# Patient Record
Sex: MALE | Race: BLACK OR AFRICAN AMERICAN | NOT HISPANIC OR LATINO
[De-identification: names, ages, dates, MRNs, and addresses within clinical notes are randomized per-mention and may not be internally consistent; named-entity substitution may affect disease eponyms.]

---

## 2022-02-03 PROBLEM — Z00.00 ENCOUNTER FOR PREVENTIVE HEALTH EXAMINATION: Status: ACTIVE | Noted: 2022-02-03

## 2022-03-15 ENCOUNTER — NON-APPOINTMENT (OUTPATIENT)
Age: 62
End: 2022-03-15

## 2022-03-15 ENCOUNTER — APPOINTMENT (OUTPATIENT)
Dept: ORTHOPEDIC SURGERY | Facility: CLINIC | Age: 62
End: 2022-03-15
Payer: COMMERCIAL

## 2022-03-15 VITALS
SYSTOLIC BLOOD PRESSURE: 191 MMHG | WEIGHT: 210.5 LBS | BODY MASS INDEX: 31.9 KG/M2 | DIASTOLIC BLOOD PRESSURE: 94 MMHG | HEART RATE: 61 BPM | HEIGHT: 68 IN

## 2022-03-15 DIAGNOSIS — M25.552 PAIN IN LEFT HIP: ICD-10-CM

## 2022-03-15 DIAGNOSIS — M16.11 UNILATERAL PRIMARY OSTEOARTHRITIS, RIGHT HIP: ICD-10-CM

## 2022-03-15 DIAGNOSIS — M25.551 PAIN IN RIGHT HIP: ICD-10-CM

## 2022-03-15 DIAGNOSIS — Z86.39 PERSONAL HISTORY OF OTHER ENDOCRINE, NUTRITIONAL AND METABOLIC DISEASE: ICD-10-CM

## 2022-03-15 DIAGNOSIS — Z78.9 OTHER SPECIFIED HEALTH STATUS: ICD-10-CM

## 2022-03-15 PROCEDURE — 73521 X-RAY EXAM HIPS BI 2 VIEWS: CPT

## 2022-03-15 PROCEDURE — 99203 OFFICE O/P NEW LOW 30 MIN: CPT

## 2022-04-20 ENCOUNTER — APPOINTMENT (OUTPATIENT)
Dept: CARDIOLOGY | Facility: CLINIC | Age: 62
End: 2022-04-20

## 2022-07-14 ENCOUNTER — OUTPATIENT (OUTPATIENT)
Dept: OUTPATIENT SERVICES | Facility: HOSPITAL | Age: 62
LOS: 1 days | End: 2022-07-14
Payer: COMMERCIAL

## 2022-07-14 VITALS
WEIGHT: 205.03 LBS | HEART RATE: 61 BPM | HEIGHT: 69 IN | TEMPERATURE: 97 F | DIASTOLIC BLOOD PRESSURE: 87 MMHG | SYSTOLIC BLOOD PRESSURE: 151 MMHG | RESPIRATION RATE: 15 BRPM | OXYGEN SATURATION: 96 %

## 2022-07-14 DIAGNOSIS — M25.551 PAIN IN RIGHT HIP: ICD-10-CM

## 2022-07-14 DIAGNOSIS — Z98.890 OTHER SPECIFIED POSTPROCEDURAL STATES: Chronic | ICD-10-CM

## 2022-07-14 DIAGNOSIS — Z01.818 ENCOUNTER FOR OTHER PREPROCEDURAL EXAMINATION: ICD-10-CM

## 2022-07-14 DIAGNOSIS — M16.11 UNILATERAL PRIMARY OSTEOARTHRITIS, RIGHT HIP: ICD-10-CM

## 2022-07-14 LAB
A1C WITH ESTIMATED AVERAGE GLUCOSE RESULT: 7.3 % — HIGH (ref 4–5.6)
ALBUMIN SERPL ELPH-MCNC: 3.8 G/DL — SIGNIFICANT CHANGE UP (ref 3.3–5)
ALP SERPL-CCNC: 72 U/L — SIGNIFICANT CHANGE UP (ref 30–120)
ALT FLD-CCNC: 41 U/L DA — SIGNIFICANT CHANGE UP (ref 10–60)
ANION GAP SERPL CALC-SCNC: 8 MMOL/L — SIGNIFICANT CHANGE UP (ref 5–17)
APTT BLD: 32.7 SEC — SIGNIFICANT CHANGE UP (ref 27.5–35.5)
AST SERPL-CCNC: 21 U/L — SIGNIFICANT CHANGE UP (ref 10–40)
BILIRUB SERPL-MCNC: 0.4 MG/DL — SIGNIFICANT CHANGE UP (ref 0.2–1.2)
BLD GP AB SCN SERPL QL: SIGNIFICANT CHANGE UP
BUN SERPL-MCNC: 13 MG/DL — SIGNIFICANT CHANGE UP (ref 7–23)
CALCIUM SERPL-MCNC: 9.4 MG/DL — SIGNIFICANT CHANGE UP (ref 8.4–10.5)
CHLORIDE SERPL-SCNC: 101 MMOL/L — SIGNIFICANT CHANGE UP (ref 96–108)
CO2 SERPL-SCNC: 31 MMOL/L — SIGNIFICANT CHANGE UP (ref 22–31)
CREAT SERPL-MCNC: 0.99 MG/DL — SIGNIFICANT CHANGE UP (ref 0.5–1.3)
EGFR: 85 ML/MIN/1.73M2 — SIGNIFICANT CHANGE UP
ESTIMATED AVERAGE GLUCOSE: 163 MG/DL — HIGH (ref 68–114)
GLUCOSE SERPL-MCNC: 106 MG/DL — HIGH (ref 70–99)
HCT VFR BLD CALC: 45 % — SIGNIFICANT CHANGE UP (ref 39–50)
HGB BLD-MCNC: 14.4 G/DL — SIGNIFICANT CHANGE UP (ref 13–17)
INR BLD: 1.09 RATIO — SIGNIFICANT CHANGE UP (ref 0.88–1.16)
MCHC RBC-ENTMCNC: 27.8 PG — SIGNIFICANT CHANGE UP (ref 27–34)
MCHC RBC-ENTMCNC: 32 GM/DL — SIGNIFICANT CHANGE UP (ref 32–36)
MCV RBC AUTO: 86.9 FL — SIGNIFICANT CHANGE UP (ref 80–100)
MRSA PCR RESULT.: SIGNIFICANT CHANGE UP
NRBC # BLD: 0 /100 WBCS — SIGNIFICANT CHANGE UP (ref 0–0)
PLATELET # BLD AUTO: 184 K/UL — SIGNIFICANT CHANGE UP (ref 150–400)
POTASSIUM SERPL-MCNC: 4.1 MMOL/L — SIGNIFICANT CHANGE UP (ref 3.5–5.3)
POTASSIUM SERPL-SCNC: 4.1 MMOL/L — SIGNIFICANT CHANGE UP (ref 3.5–5.3)
PROT SERPL-MCNC: 8.2 G/DL — SIGNIFICANT CHANGE UP (ref 6–8.3)
PROTHROM AB SERPL-ACNC: 12.5 SEC — SIGNIFICANT CHANGE UP (ref 10.5–13.4)
RBC # BLD: 5.18 M/UL — SIGNIFICANT CHANGE UP (ref 4.2–5.8)
RBC # FLD: 13 % — SIGNIFICANT CHANGE UP (ref 10.3–14.5)
S AUREUS DNA NOSE QL NAA+PROBE: SIGNIFICANT CHANGE UP
SODIUM SERPL-SCNC: 140 MMOL/L — SIGNIFICANT CHANGE UP (ref 135–145)
WBC # BLD: 7.93 K/UL — SIGNIFICANT CHANGE UP (ref 3.8–10.5)
WBC # FLD AUTO: 7.93 K/UL — SIGNIFICANT CHANGE UP (ref 3.8–10.5)

## 2022-07-14 PROCEDURE — 93010 ELECTROCARDIOGRAM REPORT: CPT

## 2022-07-14 PROCEDURE — 93005 ELECTROCARDIOGRAM TRACING: CPT

## 2022-07-14 PROCEDURE — G0463: CPT

## 2022-07-14 NOTE — H&P PST ADULT - NSICDXPASTSURGICALHX_GEN_ALL_CORE_FT
PAST SURGICAL HISTORY:  History of arthroscopy of shoulder left, 1995    History of inguinal hernia repair bilateral, 1990's

## 2022-07-14 NOTE — H&P PST ADULT - HISTORY OF PRESENT ILLNESS
61 yo male reports bilateral hip pain which is worse on right and has prevented him from exercising and weight bearing.  Pain rates 8/10 at worst and radiates to his right buttock.  He is scheduled for right total hip replacement on 8/3/2022 @ [facility].

## 2022-07-14 NOTE — H&P PST ADULT - NSANTHOSAYNRD_GEN_A_CORE
No. JOSEMANUEL screening performed.  STOP BANG Legend: 0-2 = LOW Risk; 3-4 = INTERMEDIATE Risk; 5-8 = HIGH Risk

## 2022-07-14 NOTE — H&P PST ADULT - PROBLEM SELECTOR PLAN 1
Right total hip replacement is planned for 8/3/2022  Covid testing scheduled for 8/1/2022  Diagnostic testing performed; medical clearance requested  Pre op instructions were reviewed; best wishes offered

## 2022-07-14 NOTE — H&P PST ADULT - NSICDXFAMILYHX_GEN_ALL_CORE_FT
FAMILY HISTORY:  Father  Still living? Unknown  Family history of diabetes mellitus (DM), Age at diagnosis: Age Unknown    Mother  Still living? No  Family history of diabetes mellitus (DM), Age at diagnosis: Age Unknown

## 2022-07-14 NOTE — H&P PST ADULT - NEGATIVE ENMT SYMPTOMS
no hearing difficulty/no ear pain/no tinnitus/no vertigo/no sinus symptoms/no nasal congestion/no nasal discharge/no nasal obstruction/no post-nasal discharge/no nose bleeds/no abnormal taste sensation/no gum bleeding/no dry mouth/no throat pain/no dysphagia

## 2022-07-14 NOTE — H&P PST ADULT - MUSCULOSKELETAL
no joint swelling/no joint erythema/no joint warmth/no calf tenderness/decreased ROM due to pain/no chest wall tenderness details…

## 2022-07-14 NOTE — H&P PST ADULT - NSICDXPASTMEDICALHX_GEN_ALL_CORE_FT
PAST MEDICAL HISTORY:  COVID-19 vaccine series completed     Hypercholesteremia     Hypertension     Osteoarthritis of right hip     Type 2 diabetes mellitus

## 2022-07-26 PROBLEM — E78.00 PURE HYPERCHOLESTEROLEMIA, UNSPECIFIED: Chronic | Status: ACTIVE | Noted: 2022-07-14

## 2022-07-26 PROBLEM — M16.11 UNILATERAL PRIMARY OSTEOARTHRITIS, RIGHT HIP: Chronic | Status: ACTIVE | Noted: 2022-07-14

## 2022-07-26 PROBLEM — I10 ESSENTIAL (PRIMARY) HYPERTENSION: Chronic | Status: ACTIVE | Noted: 2022-07-14

## 2022-07-26 PROBLEM — E11.9 TYPE 2 DIABETES MELLITUS WITHOUT COMPLICATIONS: Chronic | Status: ACTIVE | Noted: 2022-07-14

## 2022-07-26 PROBLEM — Z92.29 PERSONAL HISTORY OF OTHER DRUG THERAPY: Chronic | Status: ACTIVE | Noted: 2022-07-14

## 2022-08-01 ENCOUNTER — OUTPATIENT (OUTPATIENT)
Dept: OUTPATIENT SERVICES | Facility: HOSPITAL | Age: 62
LOS: 1 days | End: 2022-08-01
Payer: COMMERCIAL

## 2022-08-01 DIAGNOSIS — Z98.890 OTHER SPECIFIED POSTPROCEDURAL STATES: Chronic | ICD-10-CM

## 2022-08-01 DIAGNOSIS — Z20.828 CONTACT WITH AND (SUSPECTED) EXPOSURE TO OTHER VIRAL COMMUNICABLE DISEASES: ICD-10-CM

## 2022-08-01 PROCEDURE — U0005: CPT

## 2022-08-01 PROCEDURE — U0003: CPT

## 2022-08-02 ENCOUNTER — FORM ENCOUNTER (OUTPATIENT)
Age: 62
End: 2022-08-02

## 2022-08-02 LAB — SARS-COV-2 RNA SPEC QL NAA+PROBE: SIGNIFICANT CHANGE UP

## 2022-08-03 ENCOUNTER — TRANSCRIPTION ENCOUNTER (OUTPATIENT)
Age: 62
End: 2022-08-03

## 2022-08-03 ENCOUNTER — RESULT REVIEW (OUTPATIENT)
Age: 62
End: 2022-08-03

## 2022-08-03 ENCOUNTER — APPOINTMENT (OUTPATIENT)
Dept: ORTHOPEDIC SURGERY | Facility: HOSPITAL | Age: 62
End: 2022-08-03

## 2022-08-03 ENCOUNTER — INPATIENT (INPATIENT)
Facility: HOSPITAL | Age: 62
LOS: 0 days | Discharge: HOME | DRG: 470 | End: 2022-08-04
Attending: ORTHOPAEDIC SURGERY | Admitting: ORTHOPAEDIC SURGERY
Payer: COMMERCIAL

## 2022-08-03 VITALS
SYSTOLIC BLOOD PRESSURE: 134 MMHG | OXYGEN SATURATION: 97 % | RESPIRATION RATE: 17 BRPM | WEIGHT: 208.56 LBS | HEIGHT: 68 IN | DIASTOLIC BLOOD PRESSURE: 71 MMHG | HEART RATE: 58 BPM | TEMPERATURE: 98 F

## 2022-08-03 DIAGNOSIS — M25.551 PAIN IN RIGHT HIP: ICD-10-CM

## 2022-08-03 DIAGNOSIS — Z98.890 OTHER SPECIFIED POSTPROCEDURAL STATES: Chronic | ICD-10-CM

## 2022-08-03 LAB
ANION GAP SERPL CALC-SCNC: 11 MMOL/L — SIGNIFICANT CHANGE UP (ref 5–17)
BUN SERPL-MCNC: 12 MG/DL — SIGNIFICANT CHANGE UP (ref 7–23)
CALCIUM SERPL-MCNC: 8.9 MG/DL — SIGNIFICANT CHANGE UP (ref 8.4–10.5)
CHLORIDE SERPL-SCNC: 99 MMOL/L — SIGNIFICANT CHANGE UP (ref 96–108)
CO2 SERPL-SCNC: 27 MMOL/L — SIGNIFICANT CHANGE UP (ref 22–31)
CREAT SERPL-MCNC: 1.12 MG/DL — SIGNIFICANT CHANGE UP (ref 0.5–1.3)
EGFR: 73 ML/MIN/1.73M2 — SIGNIFICANT CHANGE UP
GLUCOSE BLDC GLUCOMTR-MCNC: 123 MG/DL — HIGH (ref 70–99)
GLUCOSE BLDC GLUCOMTR-MCNC: 217 MG/DL — HIGH (ref 70–99)
GLUCOSE SERPL-MCNC: 216 MG/DL — HIGH (ref 70–99)
HCT VFR BLD CALC: 41.5 % — SIGNIFICANT CHANGE UP (ref 39–50)
HGB BLD-MCNC: 13.3 G/DL — SIGNIFICANT CHANGE UP (ref 13–17)
MCHC RBC-ENTMCNC: 28 PG — SIGNIFICANT CHANGE UP (ref 27–34)
MCHC RBC-ENTMCNC: 32 GM/DL — SIGNIFICANT CHANGE UP (ref 32–36)
MCV RBC AUTO: 87.4 FL — SIGNIFICANT CHANGE UP (ref 80–100)
NRBC # BLD: 0 /100 WBCS — SIGNIFICANT CHANGE UP (ref 0–0)
PLATELET # BLD AUTO: 188 K/UL — SIGNIFICANT CHANGE UP (ref 150–400)
POTASSIUM SERPL-MCNC: 4.2 MMOL/L — SIGNIFICANT CHANGE UP (ref 3.5–5.3)
POTASSIUM SERPL-SCNC: 4.2 MMOL/L — SIGNIFICANT CHANGE UP (ref 3.5–5.3)
RBC # BLD: 4.75 M/UL — SIGNIFICANT CHANGE UP (ref 4.2–5.8)
RBC # FLD: 12.9 % — SIGNIFICANT CHANGE UP (ref 10.3–14.5)
SODIUM SERPL-SCNC: 137 MMOL/L — SIGNIFICANT CHANGE UP (ref 135–145)
WBC # BLD: 13.19 K/UL — HIGH (ref 3.8–10.5)
WBC # FLD AUTO: 13.19 K/UL — HIGH (ref 3.8–10.5)

## 2022-08-03 PROCEDURE — 27130 TOTAL HIP ARTHROPLASTY: CPT | Mod: RT

## 2022-08-03 PROCEDURE — 88305 TISSUE EXAM BY PATHOLOGIST: CPT | Mod: 26

## 2022-08-03 PROCEDURE — 73502 X-RAY EXAM HIP UNI 2-3 VIEWS: CPT | Mod: 26,RT

## 2022-08-03 PROCEDURE — 99222 1ST HOSP IP/OBS MODERATE 55: CPT

## 2022-08-03 PROCEDURE — 88311 DECALCIFY TISSUE: CPT | Mod: 26

## 2022-08-03 DEVICE — HEAD FEM OPTION CERAMIC DELTA 44MM: Type: IMPLANTABLE DEVICE | Site: RIGHT | Status: FUNCTIONAL

## 2022-08-03 DEVICE — SLEEVE BIOLOX DELTA OPTION SZ -3: Type: IMPLANTABLE DEVICE | Site: RIGHT | Status: FUNCTIONAL

## 2022-08-03 DEVICE — CUP ACET EMPOWR CLUSTER 60MM ALPHA: Type: IMPLANTABLE DEVICE | Site: RIGHT | Status: FUNCTIONAL

## 2022-08-03 DEVICE — IMPLANTABLE DEVICE: Type: IMPLANTABLE DEVICE | Site: RIGHT | Status: FUNCTIONAL

## 2022-08-03 DEVICE — STEM FEM HIP OFFSET LAT SZ 10: Type: IMPLANTABLE DEVICE | Site: RIGHT | Status: FUNCTIONAL

## 2022-08-03 DEVICE — SCREW ACET SZ 6.5X30MM: Type: IMPLANTABLE DEVICE | Site: RIGHT | Status: FUNCTIONAL

## 2022-08-03 RX ORDER — ASPIRIN/CALCIUM CARB/MAGNESIUM 324 MG
1 TABLET ORAL
Qty: 56 | Refills: 0
Start: 2022-08-03 | End: 2022-08-30

## 2022-08-03 RX ORDER — METFORMIN HYDROCHLORIDE 850 MG/1
500 TABLET ORAL DAILY
Refills: 0 | Status: DISCONTINUED | OUTPATIENT
Start: 2022-08-04 | End: 2022-08-04

## 2022-08-03 RX ORDER — TOBRAMYCIN 0.3 %
1 DROPS OPHTHALMIC (EYE) EVERY 4 HOURS
Refills: 0 | Status: DISCONTINUED | OUTPATIENT
Start: 2022-08-03 | End: 2022-08-03

## 2022-08-03 RX ORDER — OXYCODONE HYDROCHLORIDE 5 MG/1
5 TABLET ORAL
Refills: 0 | Status: DISCONTINUED | OUTPATIENT
Start: 2022-08-03 | End: 2022-08-04

## 2022-08-03 RX ORDER — DEXTROSE 50 % IN WATER 50 %
15 SYRINGE (ML) INTRAVENOUS ONCE
Refills: 0 | Status: DISCONTINUED | OUTPATIENT
Start: 2022-08-03 | End: 2022-08-04

## 2022-08-03 RX ORDER — DEXTROSE 50 % IN WATER 50 %
25 SYRINGE (ML) INTRAVENOUS ONCE
Refills: 0 | Status: DISCONTINUED | OUTPATIENT
Start: 2022-08-03 | End: 2022-08-04

## 2022-08-03 RX ORDER — TOBRAMYCIN 0.3 %
1 DROPS OPHTHALMIC (EYE) EVERY 4 HOURS
Refills: 0 | Status: COMPLETED | OUTPATIENT
Start: 2022-08-03 | End: 2022-08-04

## 2022-08-03 RX ORDER — HYDROMORPHONE HYDROCHLORIDE 2 MG/ML
0.5 INJECTION INTRAMUSCULAR; INTRAVENOUS; SUBCUTANEOUS
Refills: 0 | Status: DISCONTINUED | OUTPATIENT
Start: 2022-08-03 | End: 2022-08-03

## 2022-08-03 RX ORDER — ONDANSETRON 8 MG/1
4 TABLET, FILM COATED ORAL EVERY 6 HOURS
Refills: 0 | Status: DISCONTINUED | OUTPATIENT
Start: 2022-08-03 | End: 2022-08-04

## 2022-08-03 RX ORDER — ATORVASTATIN CALCIUM 80 MG/1
10 TABLET, FILM COATED ORAL AT BEDTIME
Refills: 0 | Status: DISCONTINUED | OUTPATIENT
Start: 2022-08-03 | End: 2022-08-04

## 2022-08-03 RX ORDER — ONDANSETRON 8 MG/1
4 TABLET, FILM COATED ORAL ONCE
Refills: 0 | Status: DISCONTINUED | OUTPATIENT
Start: 2022-08-03 | End: 2022-08-03

## 2022-08-03 RX ORDER — POLYETHYLENE GLYCOL 3350 17 G/17G
17 POWDER, FOR SOLUTION ORAL
Qty: 0 | Refills: 0 | DISCHARGE
Start: 2022-08-03

## 2022-08-03 RX ORDER — SODIUM CHLORIDE 9 MG/ML
1000 INJECTION, SOLUTION INTRAVENOUS
Refills: 0 | Status: DISCONTINUED | OUTPATIENT
Start: 2022-08-04 | End: 2022-08-04

## 2022-08-03 RX ORDER — SENNA PLUS 8.6 MG/1
2 TABLET ORAL AT BEDTIME
Refills: 0 | Status: DISCONTINUED | OUTPATIENT
Start: 2022-08-03 | End: 2022-08-04

## 2022-08-03 RX ORDER — CEFAZOLIN SODIUM 1 G
2000 VIAL (EA) INJECTION ONCE
Refills: 0 | Status: COMPLETED | OUTPATIENT
Start: 2022-08-03 | End: 2022-08-03

## 2022-08-03 RX ORDER — HYDROMORPHONE HYDROCHLORIDE 2 MG/ML
0.5 INJECTION INTRAMUSCULAR; INTRAVENOUS; SUBCUTANEOUS
Refills: 0 | Status: DISCONTINUED | OUTPATIENT
Start: 2022-08-03 | End: 2022-08-04

## 2022-08-03 RX ORDER — DEXAMETHASONE 0.5 MG/5ML
8 ELIXIR ORAL ONCE
Refills: 0 | Status: COMPLETED | OUTPATIENT
Start: 2022-08-04 | End: 2022-08-04

## 2022-08-03 RX ORDER — GLUCAGON INJECTION, SOLUTION 0.5 MG/.1ML
1 INJECTION, SOLUTION SUBCUTANEOUS ONCE
Refills: 0 | Status: DISCONTINUED | OUTPATIENT
Start: 2022-08-03 | End: 2022-08-04

## 2022-08-03 RX ORDER — ACETAMINOPHEN 500 MG
2 TABLET ORAL
Qty: 0 | Refills: 0 | DISCHARGE
Start: 2022-08-03

## 2022-08-03 RX ORDER — CELECOXIB 200 MG/1
1 CAPSULE ORAL
Qty: 56 | Refills: 0
Start: 2022-08-03 | End: 2022-08-30

## 2022-08-03 RX ORDER — PANTOPRAZOLE SODIUM 20 MG/1
40 TABLET, DELAYED RELEASE ORAL
Refills: 0 | Status: DISCONTINUED | OUTPATIENT
Start: 2022-08-03 | End: 2022-08-04

## 2022-08-03 RX ORDER — TRANEXAMIC ACID 100 MG/ML
1000 INJECTION, SOLUTION INTRAVENOUS ONCE
Refills: 0 | Status: COMPLETED | OUTPATIENT
Start: 2022-08-03 | End: 2022-08-03

## 2022-08-03 RX ORDER — MELOXICAM 15 MG/1
1 TABLET ORAL
Qty: 0 | Refills: 0 | DISCHARGE

## 2022-08-03 RX ORDER — ACETAMINOPHEN 500 MG
1000 TABLET ORAL ONCE
Refills: 0 | Status: COMPLETED | OUTPATIENT
Start: 2022-08-03 | End: 2022-08-03

## 2022-08-03 RX ORDER — MAGNESIUM HYDROXIDE 400 MG/1
30 TABLET, CHEWABLE ORAL DAILY
Refills: 0 | Status: DISCONTINUED | OUTPATIENT
Start: 2022-08-03 | End: 2022-08-04

## 2022-08-03 RX ORDER — APREPITANT 80 MG/1
40 CAPSULE ORAL ONCE
Refills: 0 | Status: COMPLETED | OUTPATIENT
Start: 2022-08-03 | End: 2022-08-03

## 2022-08-03 RX ORDER — SENNA PLUS 8.6 MG/1
2 TABLET ORAL
Qty: 0 | Refills: 0 | DISCHARGE
Start: 2022-08-03

## 2022-08-03 RX ORDER — OXYCODONE HYDROCHLORIDE 5 MG/1
10 TABLET ORAL
Refills: 0 | Status: DISCONTINUED | OUTPATIENT
Start: 2022-08-03 | End: 2022-08-04

## 2022-08-03 RX ORDER — SODIUM CHLORIDE 9 MG/ML
500 INJECTION INTRAMUSCULAR; INTRAVENOUS; SUBCUTANEOUS ONCE
Refills: 0 | Status: COMPLETED | OUTPATIENT
Start: 2022-08-03 | End: 2022-08-03

## 2022-08-03 RX ORDER — OMEPRAZOLE 10 MG/1
1 CAPSULE, DELAYED RELEASE ORAL
Qty: 28 | Refills: 0
Start: 2022-08-03 | End: 2022-08-30

## 2022-08-03 RX ORDER — AMLODIPINE BESYLATE 2.5 MG/1
10 TABLET ORAL DAILY
Refills: 0 | Status: DISCONTINUED | OUTPATIENT
Start: 2022-08-05 | End: 2022-08-04

## 2022-08-03 RX ORDER — ASPIRIN/CALCIUM CARB/MAGNESIUM 324 MG
81 TABLET ORAL EVERY 12 HOURS
Refills: 0 | Status: DISCONTINUED | OUTPATIENT
Start: 2022-08-04 | End: 2022-08-04

## 2022-08-03 RX ORDER — CEFAZOLIN SODIUM 1 G
2000 VIAL (EA) INJECTION EVERY 8 HOURS
Refills: 0 | Status: COMPLETED | OUTPATIENT
Start: 2022-08-03 | End: 2022-08-04

## 2022-08-03 RX ORDER — OXYCODONE HYDROCHLORIDE 5 MG/1
5 TABLET ORAL ONCE
Refills: 0 | Status: DISCONTINUED | OUTPATIENT
Start: 2022-08-03 | End: 2022-08-03

## 2022-08-03 RX ORDER — CELECOXIB 200 MG/1
200 CAPSULE ORAL EVERY 12 HOURS
Refills: 0 | Status: DISCONTINUED | OUTPATIENT
Start: 2022-08-04 | End: 2022-08-04

## 2022-08-03 RX ORDER — INSULIN LISPRO 100/ML
VIAL (ML) SUBCUTANEOUS
Refills: 0 | Status: DISCONTINUED | OUTPATIENT
Start: 2022-08-03 | End: 2022-08-04

## 2022-08-03 RX ORDER — SODIUM CHLORIDE 9 MG/ML
1000 INJECTION, SOLUTION INTRAVENOUS
Refills: 0 | Status: DISCONTINUED | OUTPATIENT
Start: 2022-08-03 | End: 2022-08-04

## 2022-08-03 RX ORDER — CHLORHEXIDINE GLUCONATE 213 G/1000ML
1 SOLUTION TOPICAL ONCE
Refills: 0 | Status: COMPLETED | OUTPATIENT
Start: 2022-08-03 | End: 2022-08-03

## 2022-08-03 RX ORDER — POLYETHYLENE GLYCOL 3350 17 G/17G
17 POWDER, FOR SOLUTION ORAL AT BEDTIME
Refills: 0 | Status: DISCONTINUED | OUTPATIENT
Start: 2022-08-03 | End: 2022-08-04

## 2022-08-03 RX ORDER — ACETAMINOPHEN 500 MG
1000 TABLET ORAL EVERY 8 HOURS
Refills: 0 | Status: DISCONTINUED | OUTPATIENT
Start: 2022-08-03 | End: 2022-08-04

## 2022-08-03 RX ORDER — SODIUM CHLORIDE 9 MG/ML
1000 INJECTION, SOLUTION INTRAVENOUS
Refills: 0 | Status: DISCONTINUED | OUTPATIENT
Start: 2022-08-03 | End: 2022-08-03

## 2022-08-03 RX ORDER — DEXTROSE 50 % IN WATER 50 %
12.5 SYRINGE (ML) INTRAVENOUS ONCE
Refills: 0 | Status: DISCONTINUED | OUTPATIENT
Start: 2022-08-03 | End: 2022-08-04

## 2022-08-03 RX ADMIN — SODIUM CHLORIDE 75 MILLILITER(S): 9 INJECTION, SOLUTION INTRAVENOUS at 11:11

## 2022-08-03 RX ADMIN — Medication 1000 MILLIGRAM(S): at 22:02

## 2022-08-03 RX ADMIN — ATORVASTATIN CALCIUM 10 MILLIGRAM(S): 80 TABLET, FILM COATED ORAL at 22:02

## 2022-08-03 RX ADMIN — Medication 1000 MILLIGRAM(S): at 16:17

## 2022-08-03 RX ADMIN — CHLORHEXIDINE GLUCONATE 1 APPLICATION(S): 213 SOLUTION TOPICAL at 06:23

## 2022-08-03 RX ADMIN — Medication 1 DROP(S): at 22:04

## 2022-08-03 RX ADMIN — HYDROMORPHONE HYDROCHLORIDE 0.5 MILLIGRAM(S): 2 INJECTION INTRAMUSCULAR; INTRAVENOUS; SUBCUTANEOUS at 11:11

## 2022-08-03 RX ADMIN — HYDROMORPHONE HYDROCHLORIDE 0.5 MILLIGRAM(S): 2 INJECTION INTRAMUSCULAR; INTRAVENOUS; SUBCUTANEOUS at 11:21

## 2022-08-03 RX ADMIN — Medication 100 MILLIGRAM(S): at 17:55

## 2022-08-03 RX ADMIN — SODIUM CHLORIDE 500 MILLILITER(S): 9 INJECTION INTRAMUSCULAR; INTRAVENOUS; SUBCUTANEOUS at 15:17

## 2022-08-03 RX ADMIN — Medication 1 DROP(S): at 18:25

## 2022-08-03 RX ADMIN — SODIUM CHLORIDE 500 MILLILITER(S): 9 INJECTION INTRAMUSCULAR; INTRAVENOUS; SUBCUTANEOUS at 11:11

## 2022-08-03 RX ADMIN — SENNA PLUS 2 TABLET(S): 8.6 TABLET ORAL at 22:03

## 2022-08-03 RX ADMIN — Medication 1 DROP(S): at 18:26

## 2022-08-03 RX ADMIN — Medication 400 MILLIGRAM(S): at 15:17

## 2022-08-03 RX ADMIN — APREPITANT 40 MILLIGRAM(S): 80 CAPSULE ORAL at 06:23

## 2022-08-03 RX ADMIN — POLYETHYLENE GLYCOL 3350 17 GRAM(S): 17 POWDER, FOR SOLUTION ORAL at 22:03

## 2022-08-03 NOTE — DISCHARGE NOTE PROVIDER - NSDCCPCAREPLAN_GEN_ALL_CORE_FT
PRINCIPAL DISCHARGE DIAGNOSIS  Diagnosis: Primary osteoarthritis of right hip  Assessment and Plan of Treatment: Physical Therapy /Occupational Therapy  Total Hip Protocol   - Ambulation  - Transfers   - Stairs   - ADLs (activities of daily living)  Posterior Total Hip Replacement precautions for 4 weeks  - Abduction pillow   - High hip chair for sitting  - No internal rotation,   - No crossing legs   - No sleeping on opposite sides  • Ice 20 minutes several times daily with at least a 20 minute break in between icing sessions  Silverlon dressing is waterproof.  You may shower, but do not aim shower stream at surgical site. Pat dry after shower.   Remove Silverlon dressing on postop day #7. May shower after Silverlon removal.  You have a Prineo dressing over your incision (tape and glue).   Keep incision clean. DO NOT APPLY ANYTHING to incision site (salves/ointments/creams).  May shower.  Do not scrub incision site. Pat dry after shower. Prineo dressing will be removed at surgeon's office during first post-op appointment, 2 weeks after surgery.

## 2022-08-03 NOTE — PHYSICAL THERAPY INITIAL EVALUATION ADULT - GAIT DEVIATIONS NOTED, PT EVAL
mild right knee buckling/decreased armin/decreased step length/decreased stride length/decreased weight-shifting ability

## 2022-08-03 NOTE — DISCHARGE NOTE NURSING/CASE MANAGEMENT/SOCIAL WORK - NSSCNAMETXT_GEN_ALL_CORE
[facility] - [phone]/ [phone]  Registered Nurse to visit the day after hospital discharge; Physical Therapist to follow. Please contact the home care agency at the above phone number if you have not heard from them by 12 noon on the day after your hospital discharge.

## 2022-08-03 NOTE — DISCHARGE NOTE PROVIDER - NSDCMRMEDTOKEN_GEN_ALL_CORE_FT
amLODIPine 10 mg oral tablet: 1 tab(s) orally once a day  Aspirin Enteric Coated 81 mg oral delayed release tablet: 1 tab orally every 12 hours. Take aspirin 2 hours before Celebrex/Meloxicam.  atorvastatin 10 mg oral tablet: 1 tab(s) orally once a day  celecoxib 200 mg oral capsule: 1 cap orally every 12 hours. Take 2 hours after Aspirin.  hydroCHLOROthiazide 12.5 mg oral capsule: 1 cap(s) orally once a day  meloxicam 15 mg oral tablet: 1 tab(s) orally once a day  metFORMIN 500 mg oral tablet, extended release: 1 tab(s) orally once a day  omeprazole 20 mg oral delayed release capsule: 1 cap(s) orally once a day    acetaminophen 500 mg oral tablet: 2 tab(s) orally every 8 hours  amLODIPine 10 mg oral tablet: 1 tab(s) orally once a day  Aspirin Enteric Coated 81 mg oral delayed release tablet: 1 tab orally every 12 hours. Take aspirin 2 hours before Celebrex/Meloxicam.  atorvastatin 10 mg oral tablet: 1 tab(s) orally once a day  celecoxib 200 mg oral capsule: 1 cap orally every 12 hours. Take 2 hours after Aspirin.  hydroCHLOROthiazide 12.5 mg oral capsule: 1 cap(s) orally once a day  metFORMIN 500 mg oral tablet, extended release: 1 tab(s) orally once a day  omeprazole 20 mg oral delayed release capsule: 1 cap(s) orally once a day   oxyCODONE 5 mg oral tablet: 1 tab(s) orally every 4 hours, As Needed  for pain MDD:6  polyethylene glycol 3350 oral powder for reconstitution: 17 gram(s) orally once a day (at bedtime)  senna leaf extract oral tablet: 2 tab(s) orally once a day (at bedtime)

## 2022-08-03 NOTE — DISCHARGE NOTE PROVIDER - CARE PROVIDER_API CALL
Maikol Velasco)  Orthopedic Surgery  [address], [address]  [place] [address]  Phone: [phone]  Fax: [phone]  Established Patient  Scheduled Appointment: 08/19/2022 09:30 AM

## 2022-08-03 NOTE — DISCHARGE NOTE PROVIDER - NSDCFUSCHEDAPPT_GEN_ALL_CORE_FT
Suyapa Kincaid  [facility]  ORTHOSURG [address]  Scheduled Appointment: 08/19/2022    Maikol Velasco  [facility]  ORTHOSUR[address]  Scheduled Appointment: 10/11/2022

## 2022-08-03 NOTE — DISCHARGE NOTE PROVIDER - PROVIDER TOKENS
PROVIDER:[TOKEN:[[id number]],SCHEDULEDAPPT:[08/19/2022],SCHEDULEDAPPTTIME:[09:30 AM],ESTABLISHEDPATIENT:[T]]

## 2022-08-03 NOTE — BRIEF OPERATIVE NOTE - CONDITION POST OP
----- Message from Irene Newton sent at 1/3/2022  9:36 AM CST -----  Type: Needs Medical Advice  Who Called:  pt  Symptoms (please be specific):  Pt cancelling due to no ride and busy at work She will call back to reschedule possibly in late January  Best Call Back Number: [phone] (home) [phone] (work)    Additional Information:          stable

## 2022-08-03 NOTE — DISCHARGE NOTE NURSING/CASE MANAGEMENT/SOCIAL WORK - NSDCPEFALRISK_GEN_ALL_CORE
For information on Fall & Injury Prevention, visit: [email or web address] OR  [email or web address] OR  https://www.cdc.gov/steadi/patient.html

## 2022-08-03 NOTE — CONSULT NOTE ADULT - ASSESSMENT
63 yo male  with pmh of htn, DMII, HLD< admitted for s/p right total hip replacement day 0        s/p right total hip replacement day 0  pt/ot  pain control  dvt prophalxis    DMII  fs  diabetic diet   continue on metformin    HTN   hold hctz and norvasac    hld  statin     time spent 41 minutes

## 2022-08-03 NOTE — BRIEF OPERATIVE NOTE - NSICDXBRIEFPREOP_GEN_ALL_CORE_FT
PRE-OP DIAGNOSIS:  Degenerative joint disease (DJD) of hip 03-Aug-2022 10:41:50 right Jourdan Guzman

## 2022-08-03 NOTE — PHYSICAL THERAPY INITIAL EVALUATION ADULT - PERTINENT HX OF CURRENT PROBLEM, REHAB EVAL
reports bilateral hip pain which is worse on right and has prevented him from exercising and weight bearing.  Pain rates 8/10 at worst and radiates to his right buttock.

## 2022-08-03 NOTE — OCCUPATIONAL THERAPY INITIAL EVALUATION ADULT - ADDITIONAL COMMENTS
Lives alone but states son and ex can assist as needed. lives in an apt with 1 step. Tub shower. Has rw and commode

## 2022-08-03 NOTE — CONSULT NOTE ADULT - SUBJECTIVE AND OBJECTIVE BOX
61 yo male  with pmh of htn, DMII, HLD< admitted for s/p right total hip replacement day 0    Allergies/Medications:   Allergies:        Allergies:  	No Known Allergies:     Home Medications:   * Patient Currently Takes Medications as of 14-Jul-2022 10:28 documented in Structured Notes  · 	hydroCHLOROthiazide 12.5 mg oral capsule: Last Dose Taken:  , 1 cap(s) orally once a day  · 	amLODIPine 10 mg oral tablet: Last Dose Taken:  , 1 tab(s) orally once a day  · 	meloxicam 15 mg oral tablet: Last Dose Taken:  , 1 tab(s) orally once a day  · 	metFORMIN 500 mg oral tablet, extended release: Last Dose Taken:  , 1 tab(s) orally once a day  · 	atorvastatin 10 mg oral tablet: Last Dose Taken:  , 1 tab(s) orally once a day    PMH/PSH/FH/SH:    Past Medical, Past Surgical, and Family History:  PAST MEDICAL HISTORY:  COVID-19 vaccine series completed     Hypercholesteremia     Hypertension     Osteoarthritis of right hip     Type 2 diabetes mellitus.     PAST SURGICAL HISTORY:  History of arthroscopy of shoulder left, 1995    History of inguinal hernia repair bilateral, 1990's.     FAMILY HISTORY:  Father  Still living? Unknown  Family history of diabetes mellitus (DM), Age at diagnosis: Age Unknown    Mother  Still living? No  Family history of diabetes mellitus (DM), Age at diagnosis: Age Unknown.        Review of Systems:   · Negative General Symptoms	no fever; no chills; no malaise; no fatigue  · Negative Skin Symptoms	no rash; no itching; no dryness  · Negative Ophthalmologic Symptoms	no diplopia; no photophobia; no loss of vision L; no loss of vision R  · Ophthalmologic Comments	glasses  · Negative ENMT Symptoms	no hearing difficulty; no ear pain; no tinnitus; no vertigo; no sinus symptoms; no nasal congestion; no nasal discharge; no nasal obstruction; no post-nasal discharge; no nose bleeds; no abnormal taste sensation; no gum bleeding; no dry mouth; no throat pain; no dysphagia  · Negative Respiratory and Thorax Symptoms	no wheezing; no dyspnea; no cough  · Negative Cardiovascular Symptoms	no chest pain; no palpitations; no dyspnea on exertion  · Negative Gastrointestinal Symptoms	no change in bowel habits; no abdominal pain  · Negative General Genitourinary Symptoms	no dysuria; normal urinary frequency  · Negative Musculoskeletal Symptoms	no joint swelling; no neck pain; no back pain  · Musculoskeletal Symptoms	arthritis; joint pain  · Musculoskeletal Comments	bilateral hip pain, worse on right  · Negative Neurological Symptoms	no weakness; no tremors; no vertigo; no loss of sensation; no difficulty walking; no headache  · Negative Psychiatric Symptoms	no depression; no anxiety; no insomnia; no memory loss  · Hematology/Lymphatics	negative         Physical Exam:  · Constitutional	well-groomed; no distress  · Eyes	PERRL; EOMI; conjunctiva clear  · ENMT	no gross abnormalities  · Respiratory	clear to auscultation bilaterally; no wheezes; no rales; no rhonchi  · Cardiovascular	regular rate and rhythm; S1 S2 present; no gallops; no rub; no murmur  · Gastrointestinal	soft; nontender; nondistended; normal active bowel sounds  · Genitourinary Male	normal external genitalia; no hernia; no discharge; no mass; no tenderness; no ulcer; no penile lesion; no palpable testicular mass; no scrotal mass  · Neurological	cranial nerves II-XII intact; sensation intact  · Skin	warm and dry; color normal; no rashes; no ulcers  · Lymphatic	No lymphadedenopathy  · Musculoskeletal	no joint swelling; no joint erythema; no joint warmth; no calf tenderness; no chest wall tenderness; decreased ROM due to pain  · Musculoskeletal Comments	bilateral hip pain, worse on right, radiation to buttocks  · Psychiatric	normal affect; alert and oriented x3; normal behavior

## 2022-08-03 NOTE — PHYSICAL THERAPY INITIAL EVALUATION ADULT - GENERAL OBSERVATIONS, REHAB EVAL
Peripheral nerve/Neuraxial procedure note : epidural catheter  Pre-Procedure  Performed by Jose Mendiola MD  Location: OB      Pre-Anesthestic Checklist: patient identified, risks and benefits discussed, informed consent, monitors and equipment checked, pre-op evaluation and at physician/surgeon's request    Timeout  Correct Patient: Yes   Correct Procedure: Yes   Correct Site: Yes   Correct Laterality: N/A   Correct Position: Yes   Site Marked: N/A   .   Procedure Documentation    .    Procedure:    Epidural catheter.  Insertion Site:L3-4  (midline approach) Injection technique: LORT saline   Local skin infiltrated with mL of 1% lidocaine.  GABRIELA at 4 cm     Patient Prep;chlorhexidine gluconate and isopropyl alcohol.  .  Needle: Touhy needle Needle Gauge: 17.    Needle Length (Inches) 5  # of attempts: 1 and # of redirects:  .   Catheter: 18 G . .  Catheter threaded easily  .  8 cm at skin.   .    Assessment/Narrative  Paresthesias: No.  .  .  Aspiration negative for heme or CSF  . Test dose of 3 mL at. Test dose negative for signs of intravascular, subdural or intrathecal injection. Comments:  Risks, benefits, alternatives of epidural analgesia discussed with the patient who agrees to proceed.  After a procedural timeout confirming the correct patient and details of the procedure, 1% lidocaine was injected superficially over the skin for topical anesthesia.  A 17 G Tuohy needle was advanced at the above lumbar interspace until contact was felt with ligamentum flavum.  Loss of resistance to saline was encountered at above noted depth.  3 ml of saline was injected to expand the epidural space.  The epidural catheter was then easily threaded to the depth noted above.  Paresthesias were as noted above.Ropivacaine 10 ml bolus via epidural catheter administered at end of procedure.  Patient tolerated well.         
MEDIASTINAL SUMP  BILATERAL PLEURAL TUBES
+bandaging, IV, abd wedge, SCD, ice

## 2022-08-03 NOTE — DISCHARGE NOTE NURSING/CASE MANAGEMENT/SOCIAL WORK - PATIENT PORTAL LINK FT
You can access the FollowMyHealth Patient Portal offered by [facility] by registering at the following website: [email or web address]. By joining [facility]’s FollowMyHealth portal, you will also be able to view your health information using other applications (apps) compatible with our system.

## 2022-08-03 NOTE — DISCHARGE NOTE PROVIDER - HOSPITAL COURSE
This patient was admitted to [facility] with a history of severe degenerative joint disease of the right hip.  Patient underwent Pre-Surgical Testing and was medically cleared to undergo elective procedure. Patient underwent right THR by Dr. Maikol Velasco on 8/3/22. Procedure was well tolerated.  No operative or sowmya-operative complications arose during patient's hospital course.  Patient received antibiotic according to SCIP guidelines for infection prevention.  Aspirin 81mg q 12 h was given for DVT prophylaxis, in addition to the use of SCDs.  Anesthesia, Medical Hospitalist, Physical Therapy and Occupational Therapy were consulted. Patient is stable for discharge with a good prognosis.  Appropriate discharge instructions and medications are provided in this document.

## 2022-08-03 NOTE — DISCHARGE NOTE PROVIDER - NSDCCPTREATMENT_GEN_ALL_CORE_FT
PRINCIPAL PROCEDURE  Procedure: Right total hip replacement  Findings and Treatment: Severe DJD right hip

## 2022-08-03 NOTE — PHYSICAL THERAPY INITIAL EVALUATION ADULT - CRITERIA FOR SKILLED THERAPEUTIC INTERVENTIONS
HCPT, RW/impairments found/anticipated equipment needs at discharge/anticipated discharge recommendation

## 2022-08-03 NOTE — BRIEF OPERATIVE NOTE - NSICDXBRIEFPOSTOP_GEN_ALL_CORE_FT
POST-OP DIAGNOSIS:  Degenerative joint disease (DJD) of hip 03-Aug-2022 10:42:22 right Jourdan Guzman

## 2022-08-04 VITALS
RESPIRATION RATE: 16 BRPM | SYSTOLIC BLOOD PRESSURE: 128 MMHG | TEMPERATURE: 98 F | DIASTOLIC BLOOD PRESSURE: 72 MMHG | HEART RATE: 63 BPM | OXYGEN SATURATION: 95 %

## 2022-08-04 LAB
ANION GAP SERPL CALC-SCNC: 8 MMOL/L — SIGNIFICANT CHANGE UP (ref 5–17)
BUN SERPL-MCNC: 11 MG/DL — SIGNIFICANT CHANGE UP (ref 7–23)
CALCIUM SERPL-MCNC: 9 MG/DL — SIGNIFICANT CHANGE UP (ref 8.4–10.5)
CHLORIDE SERPL-SCNC: 102 MMOL/L — SIGNIFICANT CHANGE UP (ref 96–108)
CO2 SERPL-SCNC: 28 MMOL/L — SIGNIFICANT CHANGE UP (ref 22–31)
CREAT SERPL-MCNC: 0.95 MG/DL — SIGNIFICANT CHANGE UP (ref 0.5–1.3)
EGFR: 89 ML/MIN/1.73M2 — SIGNIFICANT CHANGE UP
GLUCOSE BLDC GLUCOMTR-MCNC: 147 MG/DL — HIGH (ref 70–99)
GLUCOSE BLDC GLUCOMTR-MCNC: 178 MG/DL — HIGH (ref 70–99)
GLUCOSE BLDC GLUCOMTR-MCNC: 186 MG/DL — HIGH (ref 70–99)
GLUCOSE SERPL-MCNC: 156 MG/DL — HIGH (ref 70–99)
HCT VFR BLD CALC: 37.8 % — LOW (ref 39–50)
HGB BLD-MCNC: 12.5 G/DL — LOW (ref 13–17)
MCHC RBC-ENTMCNC: 28.7 PG — SIGNIFICANT CHANGE UP (ref 27–34)
MCHC RBC-ENTMCNC: 33.1 GM/DL — SIGNIFICANT CHANGE UP (ref 32–36)
MCV RBC AUTO: 86.9 FL — SIGNIFICANT CHANGE UP (ref 80–100)
NRBC # BLD: 0 /100 WBCS — SIGNIFICANT CHANGE UP (ref 0–0)
PLATELET # BLD AUTO: 167 K/UL — SIGNIFICANT CHANGE UP (ref 150–400)
POTASSIUM SERPL-MCNC: 4.3 MMOL/L — SIGNIFICANT CHANGE UP (ref 3.5–5.3)
POTASSIUM SERPL-SCNC: 4.3 MMOL/L — SIGNIFICANT CHANGE UP (ref 3.5–5.3)
RBC # BLD: 4.35 M/UL — SIGNIFICANT CHANGE UP (ref 4.2–5.8)
RBC # FLD: 12.8 % — SIGNIFICANT CHANGE UP (ref 10.3–14.5)
SODIUM SERPL-SCNC: 138 MMOL/L — SIGNIFICANT CHANGE UP (ref 135–145)
WBC # BLD: 13.45 K/UL — HIGH (ref 3.8–10.5)
WBC # FLD AUTO: 13.45 K/UL — HIGH (ref 3.8–10.5)

## 2022-08-04 PROCEDURE — 73502 X-RAY EXAM HIP UNI 2-3 VIEWS: CPT

## 2022-08-04 PROCEDURE — 97165 OT EVAL LOW COMPLEX 30 MIN: CPT

## 2022-08-04 PROCEDURE — 80048 BASIC METABOLIC PNL TOTAL CA: CPT

## 2022-08-04 PROCEDURE — 97116 GAIT TRAINING THERAPY: CPT

## 2022-08-04 PROCEDURE — 88311 DECALCIFY TISSUE: CPT

## 2022-08-04 PROCEDURE — 85027 COMPLETE CBC AUTOMATED: CPT

## 2022-08-04 PROCEDURE — C1713: CPT

## 2022-08-04 PROCEDURE — 97535 SELF CARE MNGMENT TRAINING: CPT

## 2022-08-04 PROCEDURE — 97110 THERAPEUTIC EXERCISES: CPT

## 2022-08-04 PROCEDURE — 88305 TISSUE EXAM BY PATHOLOGIST: CPT

## 2022-08-04 PROCEDURE — 99232 SBSQ HOSP IP/OBS MODERATE 35: CPT

## 2022-08-04 PROCEDURE — 97530 THERAPEUTIC ACTIVITIES: CPT

## 2022-08-04 PROCEDURE — 36415 COLL VENOUS BLD VENIPUNCTURE: CPT

## 2022-08-04 PROCEDURE — 82962 GLUCOSE BLOOD TEST: CPT

## 2022-08-04 PROCEDURE — C1776: CPT

## 2022-08-04 PROCEDURE — 97161 PT EVAL LOW COMPLEX 20 MIN: CPT

## 2022-08-04 RX ORDER — OXYCODONE HYDROCHLORIDE 5 MG/1
1 TABLET ORAL
Qty: 42 | Refills: 0
Start: 2022-08-04 | End: 2022-08-10

## 2022-08-04 RX ADMIN — OXYCODONE HYDROCHLORIDE 5 MILLIGRAM(S): 5 TABLET ORAL at 06:41

## 2022-08-04 RX ADMIN — Medication 1000 MILLIGRAM(S): at 05:53

## 2022-08-04 RX ADMIN — CELECOXIB 200 MILLIGRAM(S): 200 CAPSULE ORAL at 09:27

## 2022-08-04 RX ADMIN — Medication 1000 MILLIGRAM(S): at 05:47

## 2022-08-04 RX ADMIN — Medication 101.6 MILLIGRAM(S): at 05:43

## 2022-08-04 RX ADMIN — PANTOPRAZOLE SODIUM 40 MILLIGRAM(S): 20 TABLET, DELAYED RELEASE ORAL at 05:44

## 2022-08-04 RX ADMIN — Medication 1 DROP(S): at 02:34

## 2022-08-04 RX ADMIN — Medication 1000 MILLIGRAM(S): at 15:00

## 2022-08-04 RX ADMIN — Medication 81 MILLIGRAM(S): at 05:47

## 2022-08-04 RX ADMIN — OXYCODONE HYDROCHLORIDE 5 MILLIGRAM(S): 5 TABLET ORAL at 13:10

## 2022-08-04 RX ADMIN — METFORMIN HYDROCHLORIDE 500 MILLIGRAM(S): 850 TABLET ORAL at 12:35

## 2022-08-04 RX ADMIN — Medication 100 MILLIGRAM(S): at 02:06

## 2022-08-04 RX ADMIN — SODIUM CHLORIDE 100 MILLILITER(S): 9 INJECTION, SOLUTION INTRAVENOUS at 02:07

## 2022-08-04 RX ADMIN — Medication 1000 MILLIGRAM(S): at 14:37

## 2022-08-04 RX ADMIN — SODIUM CHLORIDE 100 MILLILITER(S): 9 INJECTION, SOLUTION INTRAVENOUS at 05:48

## 2022-08-04 RX ADMIN — CELECOXIB 200 MILLIGRAM(S): 200 CAPSULE ORAL at 09:57

## 2022-08-04 RX ADMIN — Medication 81 MILLIGRAM(S): at 18:49

## 2022-08-04 RX ADMIN — OXYCODONE HYDROCHLORIDE 5 MILLIGRAM(S): 5 TABLET ORAL at 03:35

## 2022-08-04 RX ADMIN — OXYCODONE HYDROCHLORIDE 5 MILLIGRAM(S): 5 TABLET ORAL at 02:44

## 2022-08-04 RX ADMIN — OXYCODONE HYDROCHLORIDE 5 MILLIGRAM(S): 5 TABLET ORAL at 05:56

## 2022-08-04 RX ADMIN — OXYCODONE HYDROCHLORIDE 5 MILLIGRAM(S): 5 TABLET ORAL at 12:40

## 2022-08-04 RX ADMIN — Medication 1 DROP(S): at 00:08

## 2022-08-04 NOTE — PROGRESS NOTE ADULT - SUBJECTIVE AND OBJECTIVE BOX
Discharge medication calendar:  ASA EC 81mg q12h x 4 weeks  APAP 1000mg q8h x 2-3 weeks  Celecoxib 200mg q12h x 21 days  Omeprazole 20mg QAM x 4 weeks  Narcotic PRN  Docusate 100mg TID while taking narcotic  Miralax, Senna, or Bisacodyl PRN for treatment of constipation  
Patient is a 62y old  Male who presents with a chief complaint of right hip pain (03 Aug 2022 15:00)        HPI:  s/p right total hip replacement day 1      SUBJECTIVE & OBJECTIVE: Pt seen and examined at bedside. nad    PHYSICAL EXAM:  T(C): 36.8 (08-04-22 @ 07:34), Max: 36.9 (08-03-22 @ 23:06)  HR: 61 (08-04-22 @ 07:34) (61 - 84)  BP: 131/74 (08-04-22 @ 07:34) (112/62 - 146/77)  RR: 16 (08-04-22 @ 07:34) (15 - 19)  SpO2: 93% (08-04-22 @ 07:34) (92% - 99%)  Wt(kg): --   GENERAL: NAD, well-groomed, well-developed  ENMT: Moist mucous membranes  NECK: Supple, No JVD  NERVOUS SYSTEM:  Alert & Oriented X3,  CHEST/LUNG: Clear to auscultation bilaterally; No rales, rhonchi, wheezing, or rubs  HEART: Regular rate and rhythm; No murmurs, rubs, or gallops  ABDOMEN: Soft, Nontender, Nondistended; Bowel sounds present  EXTREMITIES:  2+ Peripheral Pulses, No clubbing, cyanosis, or edema        MEDICATIONS  (STANDING):  acetaminophen     Tablet .. 1000 milliGRAM(s) Oral every 8 hours  aspirin enteric coated 81 milliGRAM(s) Oral every 12 hours  atorvastatin 10 milliGRAM(s) Oral at bedtime  celecoxib 200 milliGRAM(s) Oral every 12 hours  dextrose 5%. 1000 milliLiter(s) (50 mL/Hr) IV Continuous <Continuous>  dextrose 5%. 1000 milliLiter(s) (100 mL/Hr) IV Continuous <Continuous>  dextrose 50% Injectable 25 Gram(s) IV Push once  dextrose 50% Injectable 12.5 Gram(s) IV Push once  dextrose 50% Injectable 25 Gram(s) IV Push once  glucagon  Injectable 1 milliGRAM(s) IntraMuscular once  insulin lispro (ADMELOG) corrective regimen sliding scale   SubCutaneous three times a day before meals  lactated ringers. 1000 milliLiter(s) (100 mL/Hr) IV Continuous <Continuous>  metFORMIN 500 milliGRAM(s) Oral daily  pantoprazole    Tablet 40 milliGRAM(s) Oral before breakfast  polyethylene glycol 3350 17 Gram(s) Oral at bedtime  senna 2 Tablet(s) Oral at bedtime    MEDICATIONS  (PRN):  artificial  tears Solution 1 Drop(s) Left EYE every 2 hours PRN Dry Eyes  dextrose Oral Gel 15 Gram(s) Oral once PRN Blood Glucose LESS THAN 70 milliGRAM(s)/deciliter  HYDROmorphone  Injectable 0.5 milliGRAM(s) IV Push every 3 hours PRN breakthrough  magnesium hydroxide Suspension 30 milliLiter(s) Oral daily PRN Constipation  ondansetron Injectable 4 milliGRAM(s) IV Push every 6 hours PRN Nausea and/or Vomiting  oxyCODONE    IR 5 milliGRAM(s) Oral every 3 hours PRN Moderate Pain (4 - 6)  oxyCODONE    IR 10 milliGRAM(s) Oral every 3 hours PRN Severe Pain (7 - 10)      LABS:                        12.5   13.45 )-----------( 167      ( 04 Aug 2022 08:03 )             37.8     08-04    138  |  102  |  11  ----------------------------<  156<H>  4.3   |  28  |  0.95    Ca    9.0      04 Aug 2022 08:03            CAPILLARY BLOOD GLUCOSE      POCT Blood Glucose.: 147 mg/dL (04 Aug 2022 07:33)  POCT Blood Glucose.: 217 mg/dL (03 Aug 2022 17:49)      CAPILLARY BLOOD GLUCOSE      POCT Blood Glucose.: 147 mg/dL (04 Aug 2022 07:33)  POCT Blood Glucose.: 217 mg/dL (03 Aug 2022 17:49)    CAPILLARY BLOOD GLUCOSE      POCT Blood Glucose.: 147 mg/dL (04 Aug 2022 07:33)            RECENT CULTURES:      RADIOLOGY & ADDITIONAL TESTS:                        DVT/GI ppx  Discussed with pt @ bedside
Post Op     PRASHANT HUERTAS      62y        Male                                                                                                                 T(C): 36.8 (08-04-22 @ 07:34), Max: 36.9 (08-03-22 @ 23:06)  HR: 61 (08-04-22 @ 07:34) (61 - 84)  BP: 131/74 (08-04-22 @ 07:34) (112/62 - 146/77)  RR: 16 (08-04-22 @ 07:34) (15 - 20)  SpO2: 93% (08-04-22 @ 07:34) (92% - 99%)  Wt(kg): --    S/P   total knee hip replacement  left    Patient denies shortness of breath, chest pain, dyspnea, No complaints  Pain is XXX /10    Physical Exam    Extremity: Bilaterally:  No holmon                                           No Cord                                          PAS on                                          Neurovascular intact                                          Motor intact EHL/FHL                                          Sensation intact                                          Pulses intact DP/PT                                         Calves Soft                                         Dressing Clean / Dry / Intact abduction pillow                                         Capillary refill with 5 seconds                          12.5   13.45 )-----------( 167      ( 04 Aug 2022 08:03 )             37.8       08-04    138  |  102  |  11  ----------------------------<  156<H>  4.3   |  28  |  0.95    Ca    9.0      04 Aug 2022 08:03        A/P  -- S/P total hip replacement     -  Medicine To Follow   - DVT prophylaxis PAS asa 81mg po bid  - PT & OT   - Analagesia  - Incentive Spirometry  - Discharge Planning  - Safety Precautions  -  CBC , BMP daily    celebrex for HO discussed with patient  no complaint regarding eye this morning
PRASHANT HUERTAS [id number]    Pt is a 62y year old Male s/p right THR. Pain is minimal at rest. c/o left eye pain, "feels like there's something in there" since waking up from surgery.  States vision is normal.  Tolerating regular diet, (+) voids.  Denies chest pain/shortness of breath/nausea/vomitting.     Vital Signs Last 24 Hrs  T(C): 36.6 (03 Aug 2022 14:08), Max: 36.7 (03 Aug 2022 06:12)  T(F): 97.9 (03 Aug 2022 14:08), Max: 98 (03 Aug 2022 06:12)  HR: 73 (03 Aug 2022 14:08) (58 - 73)  BP: 132/69 (03 Aug 2022 14:08) (121/64 - 140/67)  BP(mean): --  RR: 16 (03 Aug 2022 14:08) (16 - 20)  SpO2: 96% (03 Aug 2022 14:08) (94% - 99%)    Parameters below as of 03 Aug 2022 14:08  Patient On (Oxygen Delivery Method): room air          08-03 @ 07:01  -  08-03 @ 15:01  --------------------------------------------------------  IN: 2060 mL / OUT: 150 mL / NET: 1910 mL                    PE:   RLE: Silverlon dressing CDI, Sensation intact to light touch distally, (+2) DP/PT pulses, EHL/FHL/TA intact, Capillary refill < 2 seconds. Negative calf tenderness. PAS on.   Left eye- EOMs intact, sclera injected, no obvious foreign body.    A: 62y year old Male s/p right THR     Plan:   -DVT ppx = PAS + Ecotrin   -PT/OT = OOB, THR protocols  -Hip dislocation precautions  -Pain control adequate at htis time  -Celebrex for H.O.  prophylaxis  -Medicine to follow   -Continue to Follow Labs  -Incentive spirometry  -Consult anesthesia for eye pain, possible corneal abrasion  -Plan for home discharge tomorrow

## 2022-08-04 NOTE — PROGRESS NOTE ADULT - ASSESSMENT
61 yo male  with pmh of htn, DMII, HLD< admitted for s/p right total hip replacement day 1        s/p right total hip replacement day 1  pt/ot  pain control  dvt prophalxis    DMII  fs  diabetic diet   continue on metformin    HTN   hold hctz and norvasac    hld  statin     time spent 31 minutes

## 2022-08-19 ENCOUNTER — APPOINTMENT (OUTPATIENT)
Dept: ORTHOPEDIC SURGERY | Facility: CLINIC | Age: 62
End: 2022-08-19

## 2022-08-19 VITALS — HEART RATE: 80 BPM | SYSTOLIC BLOOD PRESSURE: 155 MMHG | DIASTOLIC BLOOD PRESSURE: 94 MMHG

## 2022-08-19 PROCEDURE — 99024 POSTOP FOLLOW-UP VISIT: CPT

## 2022-08-19 PROCEDURE — 73502 X-RAY EXAM HIP UNI 2-3 VIEWS: CPT | Mod: RT

## 2022-08-19 RX ORDER — AMOXICILLIN 500 MG/1
500 CAPSULE ORAL
Qty: 12 | Refills: 4 | Status: ACTIVE | COMMUNITY
Start: 2022-08-19 | End: 1900-01-01

## 2022-08-19 NOTE — HISTORY OF PRESENT ILLNESS
[___ Days Post Op] : post op day #[unfilled] [0] : no pain reported [Clean/Dry/Intact] : clean, dry and intact [Swelling] : swollen [Neuro Intact] : an unremarkable neurological exam [Vascular Intact] : ~T peripheral vascular exam normal [Negative Darrick's] : maneuvers demonstrated a negative Darrick's sign [Xray (Date:___)] : [unfilled] Xray -  [Doing Well] : is doing well [No Sign of Infection] : is showing no signs of infection [Adequate Pain Control] : has adequate pain control [Chills] : no chills [Diarrhea] : no diarrhea [Fever] : no fever [Nausea] : no nausea [Vomiting] : no vomiting [Erythema] : not erythematous [Discharge] : absent of discharge [Dehiscence] : not dehisced [de-identified] : Patient is a 62 year old male S/P right total hip replacement performed at [facility] on 8/3/22. Patient presents today for his first post operative visit and Dermabond tape removal. [de-identified] : The patient was discharged from the hospital to  home with physical therapy and home exercises. He verbalized feeling well overall. The patient reports current pain of 0-2/10. He is taking  Tylenol for pain relief. Patient is using EC. Aspirin 81 MG twice a day for DVT prophylaxis. 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 [de-identified] : The patient has stable antalgic gait utilizing a cane. The right lateral hip is with Dermabond tape  intact. The incision site is without redness, drainage or heat. There is no palpable tenderness, hematoma or effusion. The right hip actively flexes to 90 degrees with minimal discomfort. The external and internal rotation of the right hip is with minimal discomfort and stiffness. Leg lengths appear equal. There is no evidence of infection or cellulitis. [de-identified] : 1 view of the right hip and pelvis were obtained today. X-rays reveal a well-positioned, well fit, total hip replacement in excellent alignment. No obvious evidence of fractures, dislocation or osteolysis noted. [de-identified] : Right total hip replacement. [de-identified] : Dermabond tape was removed from the right hip incision and replaced with Steri-Strips. The patient tolerated it well. Incisional care was reviewed with the patient. The patient was advised of the nature of the healing process. Patient was advised of the importance of adhering to the DVT prophylaxis protocol utilizing EC. Aspirin 81 MG BID until 4 weeks post operative. Patient was advised of the importance of continuing to take Celebrex as ordered for prevention of heterotrophic bone ossification for a total of 21 days post operative. The patient was advised to continue with physical therapy and home exercises as recommended. Posterior hip precautions were reviewed with the patient and was encouraged to continue to maintain the precautions until 6 weeks post operative. Signs and symptoms of infection to report were reviewed. Prescription was sent to the pharmacy for antibiotic Amoxicillin for dental prophylaxis as per Dr. Velasco protocol. Patient will make an appointment to follow up with Dr. Velasco  in six weeks. Patient verbalized understanding of all instructions and all of his questions were addressed to her satisfaction. The patient was advised to call the office at anytime with any new questions or concerns.

## 2022-10-11 ENCOUNTER — APPOINTMENT (OUTPATIENT)
Dept: ORTHOPEDIC SURGERY | Facility: CLINIC | Age: 62
End: 2022-10-11

## 2022-10-11 VITALS
HEIGHT: 68 IN | DIASTOLIC BLOOD PRESSURE: 88 MMHG | SYSTOLIC BLOOD PRESSURE: 137 MMHG | BODY MASS INDEX: 31.83 KG/M2 | WEIGHT: 210 LBS | HEART RATE: 58 BPM

## 2022-10-11 DIAGNOSIS — R29.898 OTHER SYMPTOMS AND SIGNS INVOLVING THE MUSCULOSKELETAL SYSTEM: ICD-10-CM

## 2022-10-11 DIAGNOSIS — M16.12 UNILATERAL PRIMARY OSTEOARTHRITIS, LEFT HIP: ICD-10-CM

## 2022-10-11 DIAGNOSIS — Z96.641 PRESENCE OF RIGHT ARTIFICIAL HIP JOINT: ICD-10-CM

## 2022-10-11 PROCEDURE — 73502 X-RAY EXAM HIP UNI 2-3 VIEWS: CPT

## 2022-10-11 PROCEDURE — 99024 POSTOP FOLLOW-UP VISIT: CPT

## 2022-10-20 RX ORDER — MELOXICAM 15 MG/1
15 TABLET ORAL
Qty: 30 | Refills: 1 | Status: ACTIVE | COMMUNITY
Start: 2022-03-15 | End: 1900-01-01

## 2022-10-24 ENCOUNTER — APPOINTMENT (OUTPATIENT)
Dept: CARDIOLOGY | Facility: CLINIC | Age: 62
End: 2022-10-24

## 2022-10-25 ENCOUNTER — NON-APPOINTMENT (OUTPATIENT)
Age: 62
End: 2022-10-25

## 2022-10-26 ENCOUNTER — NON-APPOINTMENT (OUTPATIENT)
Age: 62
End: 2022-10-26

## 2022-10-31 RX ORDER — DICLOFENAC SODIUM 75 MG/1
75 TABLET, DELAYED RELEASE ORAL
Qty: 20 | Refills: 0 | Status: ACTIVE | COMMUNITY
Start: 2022-10-21 | End: 1900-01-01

## 2022-11-02 ENCOUNTER — APPOINTMENT (OUTPATIENT)
Dept: ORTHOPEDIC SURGERY | Facility: CLINIC | Age: 62
End: 2022-11-02

## 2022-11-02 VITALS
HEART RATE: 57 BPM | DIASTOLIC BLOOD PRESSURE: 89 MMHG | BODY MASS INDEX: 31.83 KG/M2 | WEIGHT: 210 LBS | SYSTOLIC BLOOD PRESSURE: 161 MMHG | HEIGHT: 68 IN

## 2022-11-02 DIAGNOSIS — M54.16 RADICULOPATHY, LUMBAR REGION: ICD-10-CM

## 2022-11-02 DIAGNOSIS — M51.26 OTHER INTERVERTEBRAL DISC DISPLACEMENT, LUMBAR REGION: ICD-10-CM

## 2022-11-02 DIAGNOSIS — M41.80 OTHER FORMS OF SCOLIOSIS, SITE UNSPECIFIED: ICD-10-CM

## 2022-11-02 PROCEDURE — 72170 X-RAY EXAM OF PELVIS: CPT

## 2022-11-02 PROCEDURE — 72110 X-RAY EXAM L-2 SPINE 4/>VWS: CPT

## 2022-11-02 PROCEDURE — 99214 OFFICE O/P EST MOD 30 MIN: CPT

## 2022-11-02 RX ORDER — GABAPENTIN 100 MG/1
100 CAPSULE ORAL
Qty: 30 | Refills: 2 | Status: ACTIVE | COMMUNITY
Start: 2022-11-02 | End: 1900-01-01

## 2022-11-02 NOTE — DISCUSSION/SUMMARY
[Medication Risks Reviewed] : Medication risks reviewed [de-identified] : Continue the diclofenac, rx for gabapentin 100 mg qhs\par Can use LSO over the counter\par PT Rx provided.  \par Check MRI, symptoms suggestive of acute disc herniation lumbar spine with associated right lumbar radiculopathy.  He has some nerve tension signs with localizing tenderness over the paraspinal musculature on the right sciatic notch.  Antalgic gait.  Symptoms worsened after this acute episode.\par \par The patient was educated regarding their condition, treatment options as well as prescribed course of treatment. \par Risks and benefits as well as alternatives to the proposed treatment were also provided to the patient \par They were given the opportunity to have all their questions answered to their satisfaction.\par \par Vital signs were reviewed with the patient and the patient was instructed to followup with their primary care provider for further management. There were no PAs or scribes used in the evaluation, exam or treatment plan discussion. The surgeon was the primary evaluating or treating physician as noted above.

## 2022-11-02 NOTE — PHYSICAL EXAM
[Antalgic] : antalgic [Cane] : ambulates with cane [LE] : Sensory: Intact in bilateral lower extremities [0] : left ankle jerk 0 [DP] : dorsalis pedis 2+ and symmetric bilaterally [SLR] : negative straight leg raise [Plantar Reflex Right Only] : absent on the right [Plantar Reflex Left Only] : absent on the left [DTR Reflexes Clonus Of Right Ankle (___ Beats)] : absent on the right [DTR Reflexes Clonus Of Left Ankle (___ Beats)] : absent on the left [de-identified] : The pt is awake, alert and oriented to self, place and time, is comfortable and in no acute distress. Inspection of neck, back and lower extremities bilaterally reveals no rashes or ecchymotic lesions.  There is no obvious abnormal spinal curvature in the sagittal and coronal planes. There is no tenderness over the cervical, thoracic spine, or the upper and lower extremities musculature.  Midline lumbar right paraspinal and right gluteal tenderness.  There is no sacroiliac tenderness. No greater trochanteric tenderness bilaterally. No atrophy or abnormal movements noted in the upper or lower extremities. There is no swelling noted in the upper or lower extremities bilaterally. No cervical lymphadenopathy noted anteriorly. No joint laxity noted in the upper and lower extremity joints bilaterally.\par Hip range of motion is degrees internal rotation 30° external rotation without pain. Full range of motion of the shoulders bilaterally with no significant pain\par There is no groin pain with hip internal rotation and a negative DIETER test bilaterally.  [de-identified] : healing right hip incision\par flex to mid tibia with some stiffessn, ext 30 degrees with right leg pain [de-identified] : AP pelvis demonstrates partially visualized right total hip arthroplasty in place.  Degenerative changes left hip with loss of joint space subchondral cyst.\par \par 4 views lumbar spine demonstrate minimal right-sided lumbar curve.  Normal lumbar lordosis.  Loss of disc height with retrolisthesis at L3-4 L4-5 and L5-S1.  No dynamic instability between flexion-extension.  No obvious acute fractures.

## 2023-06-23 ENCOUNTER — OUTPATIENT (OUTPATIENT)
Dept: OUTPATIENT SERVICES | Facility: HOSPITAL | Age: 63
LOS: 1 days | End: 2023-06-23
Payer: COMMERCIAL

## 2023-06-23 VITALS
TEMPERATURE: 98 F | DIASTOLIC BLOOD PRESSURE: 89 MMHG | SYSTOLIC BLOOD PRESSURE: 160 MMHG | RESPIRATION RATE: 14 BRPM | OXYGEN SATURATION: 98 % | HEART RATE: 62 BPM | WEIGHT: 212.97 LBS | HEIGHT: 68 IN

## 2023-06-23 DIAGNOSIS — Z01.818 ENCOUNTER FOR OTHER PREPROCEDURAL EXAMINATION: ICD-10-CM

## 2023-06-23 DIAGNOSIS — M25.552 PAIN IN LEFT HIP: ICD-10-CM

## 2023-06-23 DIAGNOSIS — Z98.890 OTHER SPECIFIED POSTPROCEDURAL STATES: Chronic | ICD-10-CM

## 2023-06-23 DIAGNOSIS — M16.12 UNILATERAL PRIMARY OSTEOARTHRITIS, LEFT HIP: ICD-10-CM

## 2023-06-23 DIAGNOSIS — Z96.641 PRESENCE OF RIGHT ARTIFICIAL HIP JOINT: Chronic | ICD-10-CM

## 2023-06-23 DIAGNOSIS — R94.31 ABNORMAL ELECTROCARDIOGRAM [ECG] [EKG]: ICD-10-CM

## 2023-06-23 LAB
A1C WITH ESTIMATED AVERAGE GLUCOSE RESULT: 7.6 % — HIGH (ref 4–5.6)
ALBUMIN SERPL ELPH-MCNC: 4 G/DL — SIGNIFICANT CHANGE UP (ref 3.3–5)
ALP SERPL-CCNC: 69 U/L — SIGNIFICANT CHANGE UP (ref 30–120)
ALT FLD-CCNC: 30 U/L DA — SIGNIFICANT CHANGE UP (ref 10–60)
ANION GAP SERPL CALC-SCNC: 10 MMOL/L — SIGNIFICANT CHANGE UP (ref 5–17)
APTT BLD: 31.9 SEC — SIGNIFICANT CHANGE UP (ref 27.5–35.5)
AST SERPL-CCNC: 25 U/L — SIGNIFICANT CHANGE UP (ref 10–40)
BILIRUB SERPL-MCNC: 0.3 MG/DL — SIGNIFICANT CHANGE UP (ref 0.2–1.2)
BUN SERPL-MCNC: 15 MG/DL — SIGNIFICANT CHANGE UP (ref 7–23)
CALCIUM SERPL-MCNC: 9.5 MG/DL — SIGNIFICANT CHANGE UP (ref 8.4–10.5)
CHLORIDE SERPL-SCNC: 101 MMOL/L — SIGNIFICANT CHANGE UP (ref 96–108)
CO2 SERPL-SCNC: 29 MMOL/L — SIGNIFICANT CHANGE UP (ref 22–31)
CREAT SERPL-MCNC: 1.04 MG/DL — SIGNIFICANT CHANGE UP (ref 0.5–1.3)
EGFR: 80 ML/MIN/1.73M2 — SIGNIFICANT CHANGE UP
ESTIMATED AVERAGE GLUCOSE: 171 MG/DL — HIGH (ref 68–114)
GLUCOSE SERPL-MCNC: 118 MG/DL — HIGH (ref 70–99)
HCT VFR BLD CALC: 46.5 % — SIGNIFICANT CHANGE UP (ref 39–50)
HGB BLD-MCNC: 14.6 G/DL — SIGNIFICANT CHANGE UP (ref 13–17)
INR BLD: 1.04 RATIO — SIGNIFICANT CHANGE UP (ref 0.88–1.16)
MCHC RBC-ENTMCNC: 27.4 PG — SIGNIFICANT CHANGE UP (ref 27–34)
MCHC RBC-ENTMCNC: 31.4 GM/DL — LOW (ref 32–36)
MCV RBC AUTO: 87.4 FL — SIGNIFICANT CHANGE UP (ref 80–100)
MRSA PCR RESULT.: SIGNIFICANT CHANGE UP
NRBC # BLD: 0 /100 WBCS — SIGNIFICANT CHANGE UP (ref 0–0)
PLATELET # BLD AUTO: 219 K/UL — SIGNIFICANT CHANGE UP (ref 150–400)
POTASSIUM SERPL-MCNC: 4.8 MMOL/L — SIGNIFICANT CHANGE UP (ref 3.5–5.3)
POTASSIUM SERPL-SCNC: 4.8 MMOL/L — SIGNIFICANT CHANGE UP (ref 3.5–5.3)
PROT SERPL-MCNC: 8.4 G/DL — HIGH (ref 6–8.3)
PROTHROM AB SERPL-ACNC: 12 SEC — SIGNIFICANT CHANGE UP (ref 10.5–13.4)
RBC # BLD: 5.32 M/UL — SIGNIFICANT CHANGE UP (ref 4.2–5.8)
RBC # FLD: 13.5 % — SIGNIFICANT CHANGE UP (ref 10.3–14.5)
S AUREUS DNA NOSE QL NAA+PROBE: SIGNIFICANT CHANGE UP
SODIUM SERPL-SCNC: 140 MMOL/L — SIGNIFICANT CHANGE UP (ref 135–145)
WBC # BLD: 7.71 K/UL — SIGNIFICANT CHANGE UP (ref 3.8–10.5)
WBC # FLD AUTO: 7.71 K/UL — SIGNIFICANT CHANGE UP (ref 3.8–10.5)

## 2023-06-23 PROCEDURE — G0463: CPT

## 2023-06-23 PROCEDURE — 86900 BLOOD TYPING SEROLOGIC ABO: CPT

## 2023-06-23 PROCEDURE — 83036 HEMOGLOBIN GLYCOSYLATED A1C: CPT

## 2023-06-23 PROCEDURE — 87640 STAPH A DNA AMP PROBE: CPT

## 2023-06-23 PROCEDURE — 80053 COMPREHEN METABOLIC PANEL: CPT

## 2023-06-23 PROCEDURE — 87641 MR-STAPH DNA AMP PROBE: CPT

## 2023-06-23 PROCEDURE — 86850 RBC ANTIBODY SCREEN: CPT

## 2023-06-23 PROCEDURE — 85730 THROMBOPLASTIN TIME PARTIAL: CPT

## 2023-06-23 PROCEDURE — 85610 PROTHROMBIN TIME: CPT

## 2023-06-23 PROCEDURE — 85027 COMPLETE CBC AUTOMATED: CPT

## 2023-06-23 PROCEDURE — 36415 COLL VENOUS BLD VENIPUNCTURE: CPT

## 2023-06-23 PROCEDURE — 86901 BLOOD TYPING SEROLOGIC RH(D): CPT

## 2023-06-23 PROCEDURE — 93005 ELECTROCARDIOGRAM TRACING: CPT

## 2023-06-23 PROCEDURE — 93010 ELECTROCARDIOGRAM REPORT: CPT

## 2023-06-23 NOTE — H&P PST ADULT - HISTORY OF PRESENT ILLNESS
this is a 62 y/o male who has had left hip pain for about 6 months; doesn't know test results, to have left hip replacement

## 2023-06-23 NOTE — H&P PST ADULT - NSICDXPASTMEDICALHX_GEN_ALL_CORE_FT
PAST MEDICAL HISTORY:  COVID-19 vaccine series completed     Hypercholesteremia     Hypertension     Osteoarthritis of right hip     Type 2 diabetes mellitus      PAST MEDICAL HISTORY:  COVID-19 vaccine series completed     Hypercholesteremia     Hypertension     Lumbar herniated disc     Osteoarthritis of right hip     Type 2 diabetes mellitus

## 2023-06-23 NOTE — H&P PST ADULT - NSICDXPASTSURGICALHX_GEN_ALL_CORE_FT
PAST SURGICAL HISTORY:  History of arthroscopy of shoulder left, 1995    History of inguinal hernia repair bilateral, 1990's    S/P total right hip arthroplasty

## 2023-07-12 ENCOUNTER — TRANSCRIPTION ENCOUNTER (OUTPATIENT)
Age: 63
End: 2023-07-12

## 2023-07-12 ENCOUNTER — OUTPATIENT (OUTPATIENT)
Dept: OUTPATIENT SERVICES | Facility: HOSPITAL | Age: 63
LOS: 1 days | End: 2023-07-12
Payer: COMMERCIAL

## 2023-07-12 ENCOUNTER — APPOINTMENT (OUTPATIENT)
Dept: ORTHOPEDIC SURGERY | Facility: HOSPITAL | Age: 63
End: 2023-07-12

## 2023-07-12 VITALS
HEIGHT: 68 IN | HEART RATE: 58 BPM | OXYGEN SATURATION: 94 % | SYSTOLIC BLOOD PRESSURE: 143 MMHG | RESPIRATION RATE: 19 BRPM | DIASTOLIC BLOOD PRESSURE: 76 MMHG | WEIGHT: 215.61 LBS | TEMPERATURE: 98 F

## 2023-07-12 DIAGNOSIS — M25.552 PAIN IN LEFT HIP: ICD-10-CM

## 2023-07-12 DIAGNOSIS — Z98.890 OTHER SPECIFIED POSTPROCEDURAL STATES: Chronic | ICD-10-CM

## 2023-07-12 DIAGNOSIS — Z96.641 PRESENCE OF RIGHT ARTIFICIAL HIP JOINT: Chronic | ICD-10-CM

## 2023-07-12 DIAGNOSIS — M16.12 UNILATERAL PRIMARY OSTEOARTHRITIS, LEFT HIP: ICD-10-CM

## 2023-07-12 LAB
GLUCOSE BLDC GLUCOMTR-MCNC: 135 MG/DL — HIGH (ref 70–99)
GLUCOSE BLDC GLUCOMTR-MCNC: 157 MG/DL — HIGH (ref 70–99)
GLUCOSE BLDC GLUCOMTR-MCNC: 190 MG/DL — HIGH (ref 70–99)
GLUCOSE BLDC GLUCOMTR-MCNC: 215 MG/DL — HIGH (ref 70–99)

## 2023-07-12 PROCEDURE — 73501 X-RAY EXAM HIP UNI 1 VIEW: CPT | Mod: 26,LT

## 2023-07-12 PROCEDURE — 27130 TOTAL HIP ARTHROPLASTY: CPT | Mod: LT

## 2023-07-12 PROCEDURE — 99222 1ST HOSP IP/OBS MODERATE 55: CPT

## 2023-07-12 DEVICE — IMPLANTABLE DEVICE: Type: IMPLANTABLE DEVICE | Status: FUNCTIONAL

## 2023-07-12 DEVICE — SLEEVE BIOLOX DELTA OPTION SZ -3: Type: IMPLANTABLE DEVICE | Status: FUNCTIONAL

## 2023-07-12 DEVICE — STEM FEM HIP OFFSET LAT SZ 11: Type: IMPLANTABLE DEVICE | Status: FUNCTIONAL

## 2023-07-12 DEVICE — SCREW ACET EMPOWR 6.5X35MM: Type: IMPLANTABLE DEVICE | Status: FUNCTIONAL

## 2023-07-12 DEVICE — CUP ACET EMPOWR CLUSTER 60MM ALPHA: Type: IMPLANTABLE DEVICE | Status: FUNCTIONAL

## 2023-07-12 DEVICE — HEAD FEM OPTION CERAMIC DELTA 44MM: Type: IMPLANTABLE DEVICE | Status: FUNCTIONAL

## 2023-07-12 RX ORDER — MAGNESIUM HYDROXIDE 400 MG/1
30 TABLET, CHEWABLE ORAL DAILY
Refills: 0 | Status: DISCONTINUED | OUTPATIENT
Start: 2023-07-12 | End: 2023-07-26

## 2023-07-12 RX ORDER — DEXTROSE 50 % IN WATER 50 %
12.5 SYRINGE (ML) INTRAVENOUS ONCE
Refills: 0 | Status: DISCONTINUED | OUTPATIENT
Start: 2023-07-12 | End: 2023-07-26

## 2023-07-12 RX ORDER — DEXTROSE 50 % IN WATER 50 %
25 SYRINGE (ML) INTRAVENOUS ONCE
Refills: 0 | Status: DISCONTINUED | OUTPATIENT
Start: 2023-07-12 | End: 2023-07-26

## 2023-07-12 RX ORDER — SODIUM CHLORIDE 9 MG/ML
500 INJECTION INTRAMUSCULAR; INTRAVENOUS; SUBCUTANEOUS ONCE
Refills: 0 | Status: COMPLETED | OUTPATIENT
Start: 2023-07-12 | End: 2023-07-12

## 2023-07-12 RX ORDER — ASPIRIN/CALCIUM CARB/MAGNESIUM 324 MG
1 TABLET ORAL
Qty: 56 | Refills: 0
Start: 2023-07-12 | End: 2023-08-08

## 2023-07-12 RX ORDER — ACETAMINOPHEN 500 MG
1000 TABLET ORAL EVERY 8 HOURS
Refills: 0 | Status: DISCONTINUED | OUTPATIENT
Start: 2023-07-12 | End: 2023-07-26

## 2023-07-12 RX ORDER — CELECOXIB 200 MG/1
1 CAPSULE ORAL
Qty: 56 | Refills: 0
Start: 2023-07-12 | End: 2023-08-08

## 2023-07-12 RX ORDER — CEFAZOLIN SODIUM 1 G
2000 VIAL (EA) INJECTION ONCE
Refills: 0 | Status: COMPLETED | OUTPATIENT
Start: 2023-07-12 | End: 2023-07-12

## 2023-07-12 RX ORDER — GLUCAGON INJECTION, SOLUTION 0.5 MG/.1ML
1 INJECTION, SOLUTION SUBCUTANEOUS ONCE
Refills: 0 | Status: DISCONTINUED | OUTPATIENT
Start: 2023-07-12 | End: 2023-07-26

## 2023-07-12 RX ORDER — AMLODIPINE BESYLATE 2.5 MG/1
10 TABLET ORAL DAILY
Refills: 0 | Status: DISCONTINUED | OUTPATIENT
Start: 2023-07-14 | End: 2023-07-26

## 2023-07-12 RX ORDER — ACETAMINOPHEN 500 MG
1000 TABLET ORAL ONCE
Refills: 0 | Status: COMPLETED | OUTPATIENT
Start: 2023-07-12 | End: 2023-07-12

## 2023-07-12 RX ORDER — SODIUM CHLORIDE 9 MG/ML
1000 INJECTION, SOLUTION INTRAVENOUS
Refills: 0 | Status: DISCONTINUED | OUTPATIENT
Start: 2023-07-12 | End: 2023-07-12

## 2023-07-12 RX ORDER — POLYETHYLENE GLYCOL 3350 17 G/17G
17 POWDER, FOR SOLUTION ORAL AT BEDTIME
Refills: 0 | Status: DISCONTINUED | OUTPATIENT
Start: 2023-07-12 | End: 2023-07-26

## 2023-07-12 RX ORDER — ONDANSETRON 8 MG/1
4 TABLET, FILM COATED ORAL ONCE
Refills: 0 | Status: DISCONTINUED | OUTPATIENT
Start: 2023-07-12 | End: 2023-07-12

## 2023-07-12 RX ORDER — OMEPRAZOLE 10 MG/1
1 CAPSULE, DELAYED RELEASE ORAL
Qty: 30 | Refills: 0
Start: 2023-07-12

## 2023-07-12 RX ORDER — HYDROCHLOROTHIAZIDE 25 MG
1 TABLET ORAL
Qty: 0 | Refills: 0 | DISCHARGE

## 2023-07-12 RX ORDER — TRANEXAMIC ACID 100 MG/ML
1000 INJECTION, SOLUTION INTRAVENOUS ONCE
Refills: 0 | Status: COMPLETED | OUTPATIENT
Start: 2023-07-12 | End: 2023-07-12

## 2023-07-12 RX ORDER — METFORMIN HYDROCHLORIDE 850 MG/1
500 TABLET ORAL DAILY
Refills: 0 | Status: DISCONTINUED | OUTPATIENT
Start: 2023-07-13 | End: 2023-07-26

## 2023-07-12 RX ORDER — OXYCODONE HYDROCHLORIDE 5 MG/1
5 TABLET ORAL
Refills: 0 | Status: DISCONTINUED | OUTPATIENT
Start: 2023-07-12 | End: 2023-07-13

## 2023-07-12 RX ORDER — ATORVASTATIN CALCIUM 80 MG/1
1 TABLET, FILM COATED ORAL
Qty: 0 | Refills: 0 | DISCHARGE

## 2023-07-12 RX ORDER — CHLORHEXIDINE GLUCONATE 213 G/1000ML
1 SOLUTION TOPICAL ONCE
Refills: 0 | Status: COMPLETED | OUTPATIENT
Start: 2023-07-12 | End: 2023-07-12

## 2023-07-12 RX ORDER — CELECOXIB 200 MG/1
200 CAPSULE ORAL EVERY 12 HOURS
Refills: 0 | Status: DISCONTINUED | OUTPATIENT
Start: 2023-07-12 | End: 2023-07-26

## 2023-07-12 RX ORDER — SODIUM CHLORIDE 9 MG/ML
1000 INJECTION, SOLUTION INTRAVENOUS
Refills: 0 | Status: DISCONTINUED | OUTPATIENT
Start: 2023-07-12 | End: 2023-07-26

## 2023-07-12 RX ORDER — ASPIRIN/CALCIUM CARB/MAGNESIUM 324 MG
81 TABLET ORAL EVERY 12 HOURS
Refills: 0 | Status: DISCONTINUED | OUTPATIENT
Start: 2023-07-13 | End: 2023-07-26

## 2023-07-12 RX ORDER — HYDROMORPHONE HYDROCHLORIDE 2 MG/ML
0.5 INJECTION INTRAMUSCULAR; INTRAVENOUS; SUBCUTANEOUS
Refills: 0 | Status: DISCONTINUED | OUTPATIENT
Start: 2023-07-12 | End: 2023-07-12

## 2023-07-12 RX ORDER — AMLODIPINE BESYLATE 2.5 MG/1
1 TABLET ORAL
Qty: 0 | Refills: 0 | DISCHARGE

## 2023-07-12 RX ORDER — ONDANSETRON 8 MG/1
4 TABLET, FILM COATED ORAL EVERY 6 HOURS
Refills: 0 | Status: DISCONTINUED | OUTPATIENT
Start: 2023-07-12 | End: 2023-07-26

## 2023-07-12 RX ORDER — HYDROMORPHONE HYDROCHLORIDE 2 MG/ML
1 INJECTION INTRAMUSCULAR; INTRAVENOUS; SUBCUTANEOUS
Refills: 0 | Status: DISCONTINUED | OUTPATIENT
Start: 2023-07-12 | End: 2023-07-12

## 2023-07-12 RX ORDER — ATORVASTATIN CALCIUM 80 MG/1
10 TABLET, FILM COATED ORAL AT BEDTIME
Refills: 0 | Status: DISCONTINUED | OUTPATIENT
Start: 2023-07-12 | End: 2023-07-26

## 2023-07-12 RX ORDER — PANTOPRAZOLE SODIUM 20 MG/1
40 TABLET, DELAYED RELEASE ORAL
Refills: 0 | Status: DISCONTINUED | OUTPATIENT
Start: 2023-07-12 | End: 2023-07-26

## 2023-07-12 RX ORDER — ACETAMINOPHEN 500 MG
1000 TABLET ORAL ONCE
Refills: 0 | Status: DISCONTINUED | OUTPATIENT
Start: 2023-07-12 | End: 2023-07-12

## 2023-07-12 RX ORDER — DEXTROSE 50 % IN WATER 50 %
15 SYRINGE (ML) INTRAVENOUS ONCE
Refills: 0 | Status: DISCONTINUED | OUTPATIENT
Start: 2023-07-12 | End: 2023-07-26

## 2023-07-12 RX ORDER — MELOXICAM 15 MG/1
1 TABLET ORAL
Refills: 0 | DISCHARGE

## 2023-07-12 RX ORDER — METFORMIN HYDROCHLORIDE 850 MG/1
1 TABLET ORAL
Qty: 0 | Refills: 0 | DISCHARGE

## 2023-07-12 RX ORDER — OXYCODONE HYDROCHLORIDE 5 MG/1
10 TABLET ORAL
Refills: 0 | Status: DISCONTINUED | OUTPATIENT
Start: 2023-07-12 | End: 2023-07-12

## 2023-07-12 RX ORDER — SENNA PLUS 8.6 MG/1
2 TABLET ORAL AT BEDTIME
Refills: 0 | Status: DISCONTINUED | OUTPATIENT
Start: 2023-07-12 | End: 2023-07-26

## 2023-07-12 RX ORDER — INSULIN LISPRO 100/ML
VIAL (ML) SUBCUTANEOUS
Refills: 0 | Status: DISCONTINUED | OUTPATIENT
Start: 2023-07-12 | End: 2023-07-26

## 2023-07-12 RX ORDER — CEFAZOLIN SODIUM 1 G
2000 VIAL (EA) INJECTION
Refills: 0 | Status: COMPLETED | OUTPATIENT
Start: 2023-07-12 | End: 2023-07-12

## 2023-07-12 RX ORDER — DEXAMETHASONE 0.5 MG/5ML
8 ELIXIR ORAL ONCE
Refills: 0 | Status: COMPLETED | OUTPATIENT
Start: 2023-07-13 | End: 2023-07-13

## 2023-07-12 RX ADMIN — Medication 400 MILLIGRAM(S): at 16:07

## 2023-07-12 RX ADMIN — OXYCODONE HYDROCHLORIDE 5 MILLIGRAM(S): 5 TABLET ORAL at 19:57

## 2023-07-12 RX ADMIN — SENNA PLUS 2 TABLET(S): 8.6 TABLET ORAL at 21:24

## 2023-07-12 RX ADMIN — Medication 100 MILLIGRAM(S): at 16:07

## 2023-07-12 RX ADMIN — CHLORHEXIDINE GLUCONATE 1 APPLICATION(S): 213 SOLUTION TOPICAL at 06:51

## 2023-07-12 RX ADMIN — OXYCODONE HYDROCHLORIDE 5 MILLIGRAM(S): 5 TABLET ORAL at 13:28

## 2023-07-12 RX ADMIN — OXYCODONE HYDROCHLORIDE 5 MILLIGRAM(S): 5 TABLET ORAL at 20:45

## 2023-07-12 RX ADMIN — Medication 1000 MILLIGRAM(S): at 21:26

## 2023-07-12 RX ADMIN — Medication 1000 MILLIGRAM(S): at 16:15

## 2023-07-12 RX ADMIN — ATORVASTATIN CALCIUM 10 MILLIGRAM(S): 80 TABLET, FILM COATED ORAL at 21:24

## 2023-07-12 RX ADMIN — Medication 2: at 17:18

## 2023-07-12 RX ADMIN — Medication 100 MILLIGRAM(S): at 23:29

## 2023-07-12 RX ADMIN — Medication 1000 MILLIGRAM(S): at 21:24

## 2023-07-12 RX ADMIN — SODIUM CHLORIDE 100 MILLILITER(S): 9 INJECTION, SOLUTION INTRAVENOUS at 21:25

## 2023-07-12 RX ADMIN — SODIUM CHLORIDE 1000 MILLILITER(S): 9 INJECTION INTRAMUSCULAR; INTRAVENOUS; SUBCUTANEOUS at 10:00

## 2023-07-12 RX ADMIN — SODIUM CHLORIDE 100 MILLILITER(S): 9 INJECTION, SOLUTION INTRAVENOUS at 16:54

## 2023-07-12 RX ADMIN — SODIUM CHLORIDE 1000 MILLILITER(S): 9 INJECTION INTRAMUSCULAR; INTRAVENOUS; SUBCUTANEOUS at 13:07

## 2023-07-12 RX ADMIN — CELECOXIB 200 MILLIGRAM(S): 200 CAPSULE ORAL at 21:24

## 2023-07-12 RX ADMIN — OXYCODONE HYDROCHLORIDE 5 MILLIGRAM(S): 5 TABLET ORAL at 14:15

## 2023-07-12 RX ADMIN — CELECOXIB 200 MILLIGRAM(S): 200 CAPSULE ORAL at 21:26

## 2023-07-12 NOTE — CARE COORDINATION ASSESSMENT. - LIVES WITH, PROFILE
[de-identified] : BHANU WILKINS is a 74 year patient With a 2 year history of hoarseness. It intermittently improves. He has no throat pain, dysphagia, cough, or bleeding. He does have a history of reflux and is on a PPI. He uses inhalers. He rinses after he uses the inhaler. He has been treated with steroids without improvement.  He was treated for a positive culture for candida in the past. He was last seen in 2019. He has not yet gone for speech pathology evaluation. Since his last visit, he had a pacemaker placed. He is on anticoagulants
alone

## 2023-07-12 NOTE — ASU PATIENT PROFILE, ADULT - FALL HARM RISK - UNIVERSAL INTERVENTIONS
Bed in lowest position, wheels locked, appropriate side rails in place/Call bell, personal items and telephone in reach/Instruct patient to call for assistance before getting out of bed or chair/Non-slip footwear when patient is out of bed/[place] to call system/Physically safe environment - no spills, clutter or unnecessary equipment/Purposeful Proactive Rounding/Room/bathroom lighting operational, light cord in reach

## 2023-07-12 NOTE — OCCUPATIONAL THERAPY INITIAL EVALUATION ADULT - LIVES WITH, PROFILE
Pt lives alone in an apartment, 0 steps to enter, 0 steps inside with a tub. Pt was independent with ADLs, IADLs, functional mobility/transfers prior to admission without AD. Pt reports his son will be able to provide assistance upon discharge to home./alone

## 2023-07-12 NOTE — DISCHARGE NOTE PROVIDER - NSDCCPTREATMENT_GEN_ALL_CORE_FT
PRINCIPAL PROCEDURE  Procedure: Total replacement of left hip joint  Findings and Treatment: Severe DJD left hip

## 2023-07-12 NOTE — DISCHARGE NOTE NURSING/CASE MANAGEMENT/SOCIAL WORK - NSDCPEFALRISK_GEN_ALL_CORE
For information on Fall & Injury Prevention, visit: [email or web address] OR  [email or web address] OR  https://www.cdc.gov/steadi/patient.html

## 2023-07-12 NOTE — DISCHARGE NOTE PROVIDER - HOSPITAL COURSE
This patient was admitted to [facility] with a history of severe degenerative joint disease of the left hip.  Patient underwent Pre-Surgical Testing and was medically cleared to undergo elective procedure. Patient underwent left total hip replacement by Dr. Velasco. Procedure was well tolerated.  No operative or sowmya-operative complications arose during patient's hospital course.  Patient received antibiotic according to SCIP guidelines for infection prevention.  Aspirin 81mg q 12 h was given for DVT prophylaxis, in addition to the use of SCDs.  Anesthesia, Medical Hospitalist, Physical Therapy and Occupational Therapy were consulted. Patient is stable for discharge with a good prognosis.  Appropriate discharge instructions and medications are provided in this document. Patient is going home with home care (PT/OT/Skilled Nursing).

## 2023-07-12 NOTE — DISCHARGE NOTE PROVIDER - NSDCCPGOAL_GEN_ALL_CORE_FT
To get better and follow your care plan as instructed.  Improve ambulation, ADLs and quality of life.

## 2023-07-12 NOTE — PATIENT CHOICE NOTE. - NSPTCHOICESTATE_GEN_ALL_CORE

## 2023-07-12 NOTE — ASU PATIENT PROFILE, ADULT - NSCAFFEAMTFREQ_GEN_ALL_CORE_SD
Patient Name:  Alec Jack MRN:  [id number]   :  1967  Age:  60 y.o. Sex: male     Ordering Provider: Kayy Burch MD  Primary Care Provider:  Polly Silver DO     Indications: coronary artery calcification, hyperlipidemia    Medications:     Current Outpatient Medications:     aspirin EC 81 MG EC tablet, Take 1 tablet by mouth daily, Disp: 90 tablet, Rfl: 1    sildenafil (VIAGRA) 50 MG tablet, Take 1 tablet by mouth daily as needed for Erectile Dysfunction, Disp: 30 tablet, Rfl: 1    varenicline (CHANTIX) 1 MG tablet, Take 1 tablet by mouth in the morning and 1 tablet before bedtime. , Disp: 60 tablet, Rfl: 1    omeprazole (PRILOSEC) 20 MG delayed release capsule, Take 20 mg by mouth in the morning., Disp: , Rfl:   No current facility-administered medications for this encounter.     Facility-Administered Medications Ordered in Other Encounters:     technetium sestamibi (CARDIOLITE) injection 30 millicurie, 30 millicurie, IntraVENous, ONCE PRN, Kayy Burch MD    technetium sestamibi (CARDIOLITE) injection 10 millicurie, 10 millicurie, IntraVENous, ONCE PRN, Kayy Burch MD     ------------------------------------------------------------------------------------------------    Predicted Heart Rate:  Maximum:  163   85%:  139     Heart Rate Restin   Standin     Blood Pressure Restin/78  Standin/80     Exercise Protocol Used:  ELLEN            Exercise Duration/Stage:  09:00 (Stage III)     Test terminated due to: fatigue  Imaging: Cardiolite     Maximum Heart Rate:  141 (86%) Max BP: 150/70  Max Workload: 10.10 METS     Chest Pain: No  Onset:  n/a  Severity:  n/a     Electronically signed by Wojciech Pabon RCP on 22 at 9:46 AM EDT    -------------------------------------------------------------------------------------------------  Baseline EKG: Sinus bradycardia, otherwise normal ECG    EKG Changes: No significant ST-T abnormality on peak exercise to suggest ischemia     Maximum Change:   N/A    Arrhythmias: None      Interpretation:    Good functional/exercise capacity. Normal blood pressure response to exercise. No significant ST-T abnormality to suggest ischemia. Nuclear scan report to follow.       Supervising Physician:  Rc Mars MD 1-2 cups/cans per day

## 2023-07-12 NOTE — DISCHARGE NOTE NURSING/CASE MANAGEMENT/SOCIAL WORK - PATIENT PORTAL LINK FT
You can access the FollowMyHealth Patient Portal offered by [facility] by registering at the following website: [email or web address]. By joining [facility]’s FollowMyHealth portal, you will also be able to view your health information using other applications (apps) compatible with our system.

## 2023-07-12 NOTE — DISCHARGE NOTE PROVIDER - NSDCMRMEDTOKEN_GEN_ALL_CORE_FT
acetaminophen 500 mg oral tablet: 2 tab(s) orally every 8 hours  amLODIPine 10 mg oral tablet: 1 tab(s) orally once a day  Aspirin Enteric Coated 81 mg oral delayed release tablet: 1 tab(s) orally every 12 hours Take 2 hours before celebrex  atorvastatin 10 mg oral tablet: 1 tab(s) orally once a day  CeleBREX 200 mg oral capsule: 1 cap(s) orally every 12 hours Take 2 hours after aspirin  hydroCHLOROthiazide 12.5 mg oral capsule: 1 cap(s) orally once a day  metFORMIN 500 mg oral tablet, extended release: 1 tab(s) orally once a day  omeprazole 20 mg oral delayed release capsule: 1 cap(s) orally once a day   acetaminophen 500 mg oral tablet: 2 tab(s) orally every 8 hours  amLODIPine 10 mg oral tablet: 1 tab(s) orally once a day  Aspirin Enteric Coated 81 mg oral delayed release tablet: 1 tab(s) orally every 12 hours Take 2 hours before celebrex  atorvastatin 10 mg oral tablet: 1 tab(s) orally once a day  CeleBREX 200 mg oral capsule: 1 cap(s) orally every 12 hours Take 2 hours after aspirin  hydroCHLOROthiazide 12.5 mg oral capsule: 1 cap(s) orally once a day  metFORMIN 500 mg oral tablet, extended release: 1 tab(s) orally once a day  omeprazole 20 mg oral delayed release capsule: 1 cap(s) orally once a day  oxyCODONE 5 mg oral tablet: 1 tab(s) orally every 4 hours as needed for  moderate to severe pain MDD: 6

## 2023-07-12 NOTE — ASU PATIENT PROFILE, ADULT - NSALCOHOLFREQ_GEN_A_CORE_SD
I have personally performed a face to face diagnostic evaluation on this patient. I have reviewed the ACP note and agree with the history, exam and plan of care, except as noted. monthly or less

## 2023-07-12 NOTE — PHYSICAL THERAPY INITIAL EVALUATION ADULT - ACTIVE RANGE OF MOTION EXAMINATION, REHAB EVAL
jessy. upper extremity Active ROM was WNL (within normal limits)/bilateral lower extremity Active ROM was WNL (within normal limits)

## 2023-07-12 NOTE — CARE COORDINATION ASSESSMENT. - NSDCPLANSERVICES_GEN_ALL_CORE
CM met with patient at bedside.  Patient. A&O x 4. Pt s/p  PROCEDURES: Total left hip replacement.   Pt  resting comfortably / Pt has no complaints at this time.  CM explained role of CM and availability to assist with discharge planning throughout stay.   Provided CM contact information and pt. verbalized understanding. Patient lives in an apartment on the first floor, no steps to navigate. Prior to surgery patient was ind in adls including driving. Patient identified his son Siva  as his caregiver at home who will assist hm  during his recuperation and will transport him home and to MD appointments.   Pt. is agreeable with PT/OT's recommendations for d/c plan to home with HC/PT.  CM explained home care expectations, process, insurance provisions and home safety with good understanding.   Offered list of [facility] and pt. chose [facility] at home care agency.  Provided discharge resources folder. CM made referral accordingly.  Informed them of Anticipated  DC date for 7/13/2023 and for SOC 7/14/2023.    Pt verbalizing understanding and in agreement with d/c plans.   DME: Pt has a RW, commode. hip kit and cane at home.  PCP: Dr Kwasi Salazar  Pharm: -[phone] [place] [phone]  PT Signed up to [facility], but now would like get his meds at his home pharmacy, ortho made aware/Home Care

## 2023-07-12 NOTE — PHYSICAL THERAPY INITIAL EVALUATION ADULT - CRITERIA FOR SKILLED THERAPEUTIC INTERVENTIONS
predicted duration of therapy intervention/anticipated equipment needs at discharge/anticipated discharge recommendation

## 2023-07-12 NOTE — DISCHARGE NOTE PROVIDER - NSDCFUADDAPPT_GEN_ALL_CORE_FT
Please call Dr. Velasco's office for a post-operative appointment in 2 weeks.    It is advisable to follow up with your primary care provider within the next 2-3 weeks to ensure your medications are appropriate and there are no underlying problems after your procedure.

## 2023-07-12 NOTE — DISCHARGE NOTE PROVIDER - NSDCFUADDINST_GEN_ALL_CORE_FT
- Call your doctor if you experience:  • An increase in pain not controlled by pain medication or change in activity or  position.  • Temperature greater than 101° F.  • Redness, increased swelling or foul smelling drainage from or around the  incision.  • Numbness, tingling or a change in color or temperature of the operative leg.  • Call your doctor immediately if you experience chest pain, shortness of breath or calf pain.    Pain medicine has been prescribed for you, as needed, and it often causes constipation.  Take docusate sodium (Colace) 100mg 3x daily, while taking narcotic pain medication.   For Constipation :   • Increase your water intake. Drink at least 8 glasses of water daily.  • Try adding fiber to your diet by eating fruits, vegetables and foods that are rich in grains.  • If you do experience constipation, you may take an over-the-counter laxative such as, Senokot, Miralax or  Milk of Magnesia.

## 2023-07-12 NOTE — PHYSICAL THERAPY INITIAL EVALUATION ADULT - ADDITIONAL COMMENTS
Pt lives alone in an apt with 0 JOSE, no steps inside of house. Pt reports being independent prior to surgery and did not use any ADs. Pt owns a rolling walker and cane from previous right THR surgery last august. Pt's son will be able to come stay with pt once home.

## 2023-07-12 NOTE — OCCUPATIONAL THERAPY INITIAL EVALUATION ADULT - LEVEL OF INDEPENDENCE: SIT/STAND, REHAB EVAL
minimum assist (75% patients effort) Dupixent Pregnancy And Lactation Text: This medication likely crosses the placenta but the risk for the fetus is uncertain. This medication is excreted in breast milk.

## 2023-07-12 NOTE — PHYSICAL THERAPY INITIAL EVALUATION ADULT - PERTINENT HX OF CURRENT PROBLEM, REHAB EVAL
This patient was admitted to [facility] with a history of severe degenerative joint disease of the left hip.  Patient underwent Pre-Surgical Testing and was medically cleared to undergo elective procedure. Patient underwent left total hip replacement by Dr. Velasco.

## 2023-07-12 NOTE — CONSULT NOTE ADULT - SUBJECTIVE AND OBJECTIVE BOX
HPI: 62 y/o male who has had left hip pain for about 6 months; admitted for L hip replacement    REVIEW OF SYSTEMS:  CONSTITUTIONAL: No fever, weight loss, or fatigue  EYES: No eye pain, visual disturbances, or discharge  ENMT:  No difficulty hearing, tinnitus, vertigo; No sinus or throat pain  NECK: No pain or stiffness  RESPIRATORY: No cough, wheezing, chills or hemoptysis; No shortness of breath  CARDIOVASCULAR: No chest pain, palpitations, dizziness, or leg swelling  GASTROINTESTINAL: No abdominal or epigastric pain. No nausea, vomiting, or hematemesis; No diarrhea or constipation. No melena or hematochezia.  GENITOURINARY: No dysuria, frequency, hematuria, or incontinence  NEUROLOGICAL: No headaches, memory loss, loss of strength, numbness, or tremors  MUSCULOSKELETAL: L hip pain      PAST MEDICAL & SURGICAL HISTORY:  Hypertension      Hypercholesteremia      Osteoarthritis of right hip      Type 2 diabetes mellitus      COVID-19 vaccine series completed      Lumbar herniated disc      History of inguinal hernia repair  bilateral, 1990's      History of arthroscopy of shoulder  left, 1995      S/P total right hip arthroplasty          SOCIAL HISTORY:  Tobacco; EtOH; Illicit Drugs    Allergies    No Known Allergies    Intolerances        MEDICATIONS  (STANDING):  acetaminophen     Tablet .. 1000 milliGRAM(s) Oral every 8 hours  acetaminophen   IVPB .. 1000 milliGRAM(s) IV Intermittent once  atorvastatin 10 milliGRAM(s) Oral at bedtime  ceFAZolin   IVPB 2000 milliGRAM(s) IV Intermittent <User Schedule>  celecoxib 200 milliGRAM(s) Oral every 12 hours  dextrose 5%. 1000 milliLiter(s) (50 mL/Hr) IV Continuous <Continuous>  dextrose 5%. 1000 milliLiter(s) (100 mL/Hr) IV Continuous <Continuous>  dextrose 50% Injectable 25 Gram(s) IV Push once  dextrose 50% Injectable 25 Gram(s) IV Push once  dextrose 50% Injectable 12.5 Gram(s) IV Push once  glucagon  Injectable 1 milliGRAM(s) IntraMuscular once  insulin lispro (ADMELOG) corrective regimen sliding scale   SubCutaneous three times a day before meals  lactated ringers. 1000 milliLiter(s) (100 mL/Hr) IV Continuous <Continuous>  pantoprazole    Tablet 40 milliGRAM(s) Oral before breakfast  polyethylene glycol 3350 17 Gram(s) Oral at bedtime  senna 2 Tablet(s) Oral at bedtime    MEDICATIONS  (PRN):  dextrose Oral Gel 15 Gram(s) Oral once PRN Blood Glucose LESS THAN 70 milliGRAM(s)/deciliter  HYDROmorphone  Injectable 0.5 milliGRAM(s) IV Push every 3 hours PRN breakthrough pain  magnesium hydroxide Suspension 30 milliLiter(s) Oral daily PRN Constipation  ondansetron Injectable 4 milliGRAM(s) IV Push every 6 hours PRN Nausea and/or Vomiting  oxyCODONE    IR 5 milliGRAM(s) Oral every 3 hours PRN Moderate Pain (4 - 6)  oxyCODONE    IR 10 milliGRAM(s) Oral every 3 hours PRN Severe Pain (7 - 10)      FAMILY HISTORY:  Family history of diabetes mellitus (DM) (Father, Mother)        Vital Signs Last 24 Hrs  T(C): 36.5 (12 Jul 2023 12:00), Max: 36.6 (12 Jul 2023 05:53)  T(F): 97.7 (12 Jul 2023 12:00), Max: 97.9 (12 Jul 2023 05:53)  HR: 68 (12 Jul 2023 12:00) (58 - 77)  BP: 143/79 (12 Jul 2023 12:00) (113/89 - 143/79)  BP(mean): --  RR: 18 (12 Jul 2023 12:00) (13 - 19)  SpO2: 93% (12 Jul 2023 12:00) (92% - 99%)    Parameters below as of 12 Jul 2023 12:00  Patient On (Oxygen Delivery Method): room air        PHYSICAL EXAM:    GENERAL: NAD, well-developed  HEAD:  Atraumatic, Normocephalic  EYES: EOMI, PERRLA, conjunctiva and sclera clear  ENMT: No tonsillar erythema, exudates, or enlargement; Moist mucous membranes, Good dentition, No lesions  NECK: Supple, No JVD, Normal thyroid  NERVOUS SYSTEM:  Alert & Oriented X3, Good concentration;  CHEST/LUNG: Clear to auscultation bilaterally; No rales, rhonchi, wheezing, or rubs  HEART: Regular rate and rhythm; No murmurs, rubs, or gallops  ABDOMEN: Soft, Nontender, Nondistended; Bowel sounds present  EXTREMITIES:  2+ Peripheral Pulses, No clubbing, cyanosis, or edema      LABS:              CAPILLARY BLOOD GLUCOSE      POCT Blood Glucose.: 157 mg/dL (12 Jul 2023 12:22)      RADIOLOGY & ADDITIONAL STUDIES:    EKG:   Personally Reviewed:  [ ] YES     Imaging:   Personally Reviewed:  [ ] YES     Consultant(s) Notes Reviewed:      Care Discussed with Consultants/Other Providers:

## 2023-07-12 NOTE — DISCHARGE NOTE PROVIDER - NSDCCPCAREPLAN_GEN_ALL_CORE_FT
PRINCIPAL DISCHARGE DIAGNOSIS  Diagnosis: Primary osteoarthritis of left hip  Assessment and Plan of Treatment: Physical Therapy /Occupational Therapy  Total Hip Protocol   - Ambulation  - Transfers   - Stairs   - ADLs (activities of daily living)  Posterior Total Hip Replacement precautions for 4 weeks  - Abduction pillow   - High hip chair for sitting  - No internal rotation,   - No crossing legs   - No sleeping on opposite sides  • Avoid sitting in low, soft chairs such as sofas and car seats. You should sit on a chair using firm pillows to raise the height of the seat.  • Make sure your bed level is high, so that you maintain proper leg positioning when sitting on the side, or getting in or out.  • When entering and traveling by car, sit in the front passenger seat. Make sure that the car seat is all the way back and semi-reclined before entering.  • Do not allow your knees to come together when sitting or lying in bed. Use abduction pillow.  • Ice 20 minutes several times daily with at least a 20 minute break in between icing sessions  Mepilex [place] dressing is over your surgial incision.  It is waterproof and you may shower.  Do not aim shower stream at surgical site and pat dry with clean towel after showering.   Remove Mepilex dressing 7 days after surgery and leave wound open to air.  Keep incision clean. DO NOT APPLY ANYTHING to incision site (salves/ointments/creams).  May shower.  Do not scrub incision site. Pat dry after shower. Sutures/Prineo dressing will be removed at surgeon's office during first post-op appointment, 2 weeks after surgery.

## 2023-07-12 NOTE — DISCHARGE NOTE PROVIDER - NSDCFUSCHEDAPPT_GEN_ALL_CORE_FT
Maikol Velasco  [facility].  Scheduled Appointment: 07/16/2023    [address]  Scheduled Appointment: 07/24/2023    [address]  Scheduled Appointment: 09/12/2023     Billing Type: Third-Party Bill

## 2023-07-12 NOTE — DISCHARGE NOTE NURSING/CASE MANAGEMENT/SOCIAL WORK - NSSCNAMETXT_GEN_ALL_CORE
[facility] care agency [phone] will reach out to you within 24-72 hours of your discharge to schedule home care visit/eval appointment with you. Please call agency for any queries regarding home care services

## 2023-07-12 NOTE — OCCUPATIONAL THERAPY INITIAL EVALUATION ADULT - NSOTDISCHREC_GEN_A_CORE
Pt would benefit from home OT to increase independence with ADLs, IADLs, functional mobility and assess safety in the home environment. Recommend supervision/assist with functional activities prn which pt states son will provide./Home OT

## 2023-07-12 NOTE — CONSULT NOTE ADULT - ASSESSMENT
63M s/p L hip replacement    #L hip replacement POD0  - abx and DVT ppx per ortho  - pain control  - PT/OT  - bowel regimen  - incentive spirometer  - check basic labs in am    #HTN  - resume amlodipine 10mg POD2  - hold HCTZ 12.5mg, can resume upon discharge  - monitor blood pressures closely    #HLD  - resume statin    #T2DM  - A1c 7.6  - metformin  - monitor blood glucose

## 2023-07-12 NOTE — CARE COORDINATION ASSESSMENT. - PRIMARY CARE PROVIDER FOR, PROFILE
ELECTROENCEPHALOGRAM REPORT



DATE OF SERVICE:

03/16/2020



PREAMBLE:

This is a 56-year-old female with history of a stroke, admitted with syncope 
versus

seizure.  This study is performed to evaluate for epileptiform activity.  The 
patient

currently is on Depakote 250 mg daily.



EEG FINDINGS:

Routine 21 channel awake digital EEG recording was accomplished utilizing the 
10-20

international system with bipolar and suction referential montages.  The 
recording

starts and continues with presence of diffuse to low-to-moderate voltage 
activity in

mixed theta and delta range in bihemispheric region.  There is a superimposed 
right mid

temporal focal slowing with frequent sharp wave activity is seen throughout the

recording.  Different stages of sleep are not seen.  The background does not 
seem to be

reactive to eye opening or closing.  Photic driving response was not seen.



IMPRESSION:

This is an abnormal EEG due to:

1.  Background slowing of mild to moderate degree.

    This is suggestive of generalized cerebral dysfunction as can be seen in a 
toxic

    metabolic encephalopathies or due to diffuse structural brain abnormality.

2. Superimposed focal slowing in the right temporal region, with very frequent 
right mid

    temporal sharp wave activity.  This is suggestive of focal cortical neural 
dysfunction

    with underlying cortical irritability and tendency for seizures.

Above-described activity predisposes this patient for partial onset seizures.  
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





MMODL / IJN: [id number] / Job#: [id number]

[facility]
self

## 2023-07-12 NOTE — ASU PATIENT PROFILE, ADULT - NSICDXPASTMEDICALHX_GEN_ALL_CORE_FT
PAST MEDICAL HISTORY:  COVID-19 vaccine series completed     Hypercholesteremia     Hypertension     Lumbar herniated disc     Osteoarthritis of right hip     Type 2 diabetes mellitus

## 2023-07-12 NOTE — DISCHARGE NOTE NURSING/CASE MANAGEMENT/SOCIAL WORK - NSDCPNDISPN_GEN_ALL_CORE
Education provided on the pain management plan of care/Side effects of pain management treatment/Activities of daily living, including home environment that might     exacerbate pain or reduce effectiveness of the pain management plan of care as well as strategies to address these issues Education provided on the pain management plan of care/Side effects of pain management treatment/Activities of daily living, including home environment that might     exacerbate pain or reduce effectiveness of the pain management plan of care as well as strategies to address these issues/Safe use, storage and disposal of opioids when prescribed

## 2023-07-12 NOTE — OCCUPATIONAL THERAPY INITIAL EVALUATION ADULT - MANUAL MUSCLE TESTING RESULTS, REHAB EVAL
b/l UE and RLE at least 3/4, L hip not tested 2* surgery, L knee at least 3/5/grossly assessed due to

## 2023-07-12 NOTE — DISCHARGE NOTE NURSING/CASE MANAGEMENT/SOCIAL WORK - NSDCFUADDAPPT_GEN_ALL_CORE_FT
done Please call Dr. Velasco's office for a post-operative appointment in 2 weeks.    It is advisable to follow up with your primary care provider within the next 2-3 weeks to ensure your medications are appropriate and there are no underlying problems after your procedure.

## 2023-07-12 NOTE — DISCHARGE NOTE PROVIDER - CARE PROVIDER_API CALL
Maikol Velasco  Orthopaedic Surgery  [address], [address]  [place] [address]  Phone: [phone]  Fax: [phone]  Established Patient  Follow Up Time: 2 weeks

## 2023-07-12 NOTE — ASU PATIENT PROFILE, ADULT - ABILITY TO HEAR (WITH HEARING AID OR HEARING APPLIANCE IF NORMALLY USED):
Pt states yesterday he had some irritation type pain on his skin-right side of abdomin. Today he sees a rash developing and is concerned he might have shingles and is asking for an appt tomorrow. I discussed with Dr Shivani Landa who [facility]   Pt informed and in agreement with plan Adequate: hears normal conversation without difficulty

## 2023-07-12 NOTE — ASU PREOP CHECKLIST - MUPIRONCIN COMMENTS
Sarecycline Pregnancy And Lactation Text: This medication is Pregnancy Category D and not consider safe during pregnancy. It is also excreted in breast milk. Tazorac Counseling:  Patient advised that medication is irritating and drying.  Patient may need to apply sparingly and wash off after an hour before eventually leaving it on overnight.  The patient verbalized understanding of the proper use and possible adverse effects of tazorac.  All of the patient's questions and concerns were addressed. Azithromycin Pregnancy And Lactation Text: This medication is considered safe during pregnancy and is also secreted in breast milk. n/a Topical Retinoid Pregnancy And Lactation Text: This medication is Pregnancy Category C. It is unknown if this medication is excreted in breast milk. Doxycycline Pregnancy And Lactation Text: This medication is Pregnancy Category D and not consider safe during pregnancy. It is also excreted in breast milk but is considered safe for shorter treatment courses. Isotretinoin Counseling: Patient should get monthly blood tests, not donate blood, not drive at night if vision affected, not share medication, and not undergo elective surgery for 6 months after tx completed. Side effects reviewed, pt to contact office should one occur. High Dose Vitamin A Counseling: Side effects reviewed, pt to contact office should one occur. Bactrim Pregnancy And Lactation Text: This medication is Pregnancy Category D and is known to cause fetal risk.  It is also excreted in breast milk. Isotretinoin Pregnancy And Lactation Text: This medication is Pregnancy Category X and is considered extremely dangerous during pregnancy. It is unknown if it is excreted in breast milk. Dapsone Pregnancy And Lactation Text: This medication is Pregnancy Category C and is not considered safe during pregnancy or breast feeding. Spironolactone Counseling: Patient advised regarding risks of diarrhea, abdominal pain, hyperkalemia, birth defects (for female patients), liver toxicity and renal toxicity. The patient may need blood work to monitor liver and kidney function and potassium levels while on therapy. The patient verbalized understanding of the proper use and possible adverse effects of spironolactone.  All of the patient's questions and concerns were addressed. Topical Clindamycin Counseling: Patient counseled that this medication may cause skin irritation or allergic reactions.  In the event of skin irritation, the patient was advised to reduce the amount of the drug applied or use it less frequently.   The patient verbalized understanding of the proper use and possible adverse effects of clindamycin.  All of the patient's questions and concerns were addressed. Use Enhanced Medication Counseling?: No Minocycline Counseling: Patient advised regarding possible photosensitivity and discoloration of the teeth, skin, lips, tongue and gums.  Patient instructed to avoid sunlight, if possible.  When exposed to sunlight, patients should wear protective clothing, sunglasses, and sunscreen.  The patient was instructed to call the office immediately if the following severe adverse effects occur:  hearing changes, easy bruising/bleeding, severe headache, or vision changes.  The patient verbalized understanding of the proper use and possible adverse effects of minocycline.  All of the patient's questions and concerns were addressed. Topical Sulfur Applications Counseling: Topical Sulfur Counseling: Patient counseled that this medication may cause skin irritation or allergic reactions.  In the event of skin irritation, the patient was advised to reduce the amount of the drug applied or use it less frequently.   The patient verbalized understanding of the proper use and possible adverse effects of topical sulfur application.  All of the patient's questions and concerns were addressed. Benzoyl Peroxide Counseling: Patient counseled that medicine may cause skin irritation and bleach clothing.  In the event of skin irritation, the patient was advised to reduce the amount of the drug applied or use it less frequently.   The patient verbalized understanding of the proper use and possible adverse effects of benzoyl peroxide.  All of the patient's questions and concerns were addressed. Erythromycin Pregnancy And Lactation Text: This medication is Pregnancy Category B and is considered safe during pregnancy. It is also excreted in breast milk. Detail Level: Zone Topical Clindamycin Pregnancy And Lactation Text: This medication is Pregnancy Category B and is considered safe during pregnancy. It is unknown if it is excreted in breast milk. Azithromycin Counseling:  I discussed with the patient the risks of azithromycin including but not limited to GI upset, allergic reaction, drug rash, diarrhea, and yeast infections. Bactrim Counseling:  I discussed with the patient the risks of sulfa antibiotics including but not limited to GI upset, allergic reaction, drug rash, diarrhea, dizziness, photosensitivity, and yeast infections.  Rarely, more serious reactions can occur including but not limited to aplastic anemia, agranulocytosis, methemoglobinemia, blood dyscrasias, liver or kidney failure, lung infiltrates or desquamative/blistering drug rashes. Doxycycline Counseling:  Patient counseled regarding possible photosensitivity and increased risk for sunburn.  Patient instructed to avoid sunlight, if possible.  When exposed to sunlight, patients should wear protective clothing, sunglasses, and sunscreen.  The patient was instructed to call the office immediately if the following severe adverse effects occur:  hearing changes, easy bruising/bleeding, severe headache, or vision changes.  The patient verbalized understanding of the proper use and possible adverse effects of doxycycline.  All of the patient's questions and concerns were addressed. Topical Retinoid counseling:  Patient advised to apply a pea-sized amount only at bedtime and wait 30 minutes after washing their face before applying.  If too drying, patient may add a non-comedogenic moisturizer. The patient verbalized understanding of the proper use and possible adverse effects of retinoids.  All of the patient's questions and concerns were addressed. Benzoyl Peroxide Pregnancy And Lactation Text: This medication is Pregnancy Category C. It is unknown if benzoyl peroxide is excreted in breast milk. Birth Control Pills Pregnancy And Lactation Text: This medication should be avoided if pregnant and for the first 30 days post-partum. Sarecycline Counseling: Patient advised regarding possible photosensitivity and discoloration of the teeth, skin, lips, tongue and gums.  Patient instructed to avoid sunlight, if possible.  When exposed to sunlight, patients should wear protective clothing, sunglasses, and sunscreen.  The patient was instructed to call the office immediately if the following severe adverse effects occur:  hearing changes, easy bruising/bleeding, severe headache, or vision changes.  The patient verbalized understanding of the proper use and possible adverse effects of sarecycline.  All of the patient's questions and concerns were addressed. Tetracycline Counseling: Patient counseled regarding possible photosensitivity and increased risk for sunburn.  Patient instructed to avoid sunlight, if possible.  When exposed to sunlight, patients should wear protective clothing, sunglasses, and sunscreen.  The patient was instructed to call the office immediately if the following severe adverse effects occur:  hearing changes, easy bruising/bleeding, severe headache, or vision changes.  The patient verbalized understanding of the proper use and possible adverse effects of tetracycline.  All of the patient's questions and concerns were addressed. Patient understands to avoid pregnancy while on therapy due to potential birth defects. Dapsone Counseling: I discussed with the patient the risks of dapsone including but not limited to hemolytic anemia, agranulocytosis, rashes, methemoglobinemia, kidney failure, peripheral neuropathy, headaches, GI upset, and liver toxicity.  Patients who start dapsone require monitoring including baseline LFTs and weekly CBCs for the first month, then every month thereafter.  The patient verbalized understanding of the proper use and possible adverse effects of dapsone.  All of the patient's questions and concerns were addressed. Topical Sulfur Applications Pregnancy And Lactation Text: This medication is Pregnancy Category C and has an unknown safety profile during pregnancy. It is unknown if this topical medication is excreted in breast milk. High Dose Vitamin A Pregnancy And Lactation Text: High dose vitamin A therapy is contraindicated during pregnancy and breast feeding. Tazorac Pregnancy And Lactation Text: This medication is not safe during pregnancy. It is unknown if this medication is excreted in breast milk. Spironolactone Pregnancy And Lactation Text: This medication can cause feminization of the male fetus and should be avoided during pregnancy. The active metabolite is also found in breast milk. Erythromycin Counseling:  I discussed with the patient the risks of erythromycin including but not limited to GI upset, allergic reaction, drug rash, diarrhea, increase in liver enzymes, and yeast infections. Birth Control Pills Counseling: Birth Control Pill Counseling: I discussed with the patient the potential side effects of OCPs including but not limited to increased risk of stroke, heart attack, thrombophlebitis, deep venous thrombosis, hepatic adenomas, breast changes, GI upset, headaches, and depression.  The patient verbalized understanding of the proper use and possible adverse effects of OCPs. All of the patient's questions and concerns were addressed.

## 2023-07-12 NOTE — CARE COORDINATION ASSESSMENT. - NSPASTMEDSURGHISTORY_GEN_ALL_CORE_FT
PAST MEDICAL & SURGICAL HISTORY:  COVID-19 vaccine series completed      Type 2 diabetes mellitus      Osteoarthritis of right hip      Hypercholesteremia      Hypertension      History of arthroscopy of shoulder  left, 1995      History of inguinal hernia repair  bilateral, 1990's      Lumbar herniated disc      S/P total right hip arthroplasty

## 2023-07-13 VITALS
HEART RATE: 63 BPM | SYSTOLIC BLOOD PRESSURE: 149 MMHG | OXYGEN SATURATION: 92 % | RESPIRATION RATE: 20 BRPM | TEMPERATURE: 98 F | DIASTOLIC BLOOD PRESSURE: 81 MMHG

## 2023-07-13 LAB
ANION GAP SERPL CALC-SCNC: 7 MMOL/L — SIGNIFICANT CHANGE UP (ref 5–17)
BUN SERPL-MCNC: 13 MG/DL — SIGNIFICANT CHANGE UP (ref 7–23)
CALCIUM SERPL-MCNC: 8.8 MG/DL — SIGNIFICANT CHANGE UP (ref 8.4–10.5)
CHLORIDE SERPL-SCNC: 105 MMOL/L — SIGNIFICANT CHANGE UP (ref 96–108)
CO2 SERPL-SCNC: 28 MMOL/L — SIGNIFICANT CHANGE UP (ref 22–31)
CREAT SERPL-MCNC: 0.91 MG/DL — SIGNIFICANT CHANGE UP (ref 0.5–1.3)
EGFR: 94 ML/MIN/1.73M2 — SIGNIFICANT CHANGE UP
GLUCOSE BLDC GLUCOMTR-MCNC: 156 MG/DL — HIGH (ref 70–99)
GLUCOSE BLDC GLUCOMTR-MCNC: 199 MG/DL — HIGH (ref 70–99)
GLUCOSE SERPL-MCNC: 161 MG/DL — HIGH (ref 70–99)
HCT VFR BLD CALC: 36.4 % — LOW (ref 39–50)
HGB BLD-MCNC: 11.5 G/DL — LOW (ref 13–17)
MCHC RBC-ENTMCNC: 27.5 PG — SIGNIFICANT CHANGE UP (ref 27–34)
MCHC RBC-ENTMCNC: 31.6 GM/DL — LOW (ref 32–36)
MCV RBC AUTO: 87.1 FL — SIGNIFICANT CHANGE UP (ref 80–100)
NRBC # BLD: 0 /100 WBCS — SIGNIFICANT CHANGE UP (ref 0–0)
PLATELET # BLD AUTO: 168 K/UL — SIGNIFICANT CHANGE UP (ref 150–400)
POTASSIUM SERPL-MCNC: 4.2 MMOL/L — SIGNIFICANT CHANGE UP (ref 3.5–5.3)
POTASSIUM SERPL-SCNC: 4.2 MMOL/L — SIGNIFICANT CHANGE UP (ref 3.5–5.3)
RBC # BLD: 4.18 M/UL — LOW (ref 4.2–5.8)
RBC # FLD: 13.5 % — SIGNIFICANT CHANGE UP (ref 10.3–14.5)
SODIUM SERPL-SCNC: 140 MMOL/L — SIGNIFICANT CHANGE UP (ref 135–145)
WBC # BLD: 12.44 K/UL — HIGH (ref 3.8–10.5)
WBC # FLD AUTO: 12.44 K/UL — HIGH (ref 3.8–10.5)

## 2023-07-13 PROCEDURE — 27130 TOTAL HIP ARTHROPLASTY: CPT | Mod: LT

## 2023-07-13 PROCEDURE — 97116 GAIT TRAINING THERAPY: CPT

## 2023-07-13 PROCEDURE — 80048 BASIC METABOLIC PNL TOTAL CA: CPT

## 2023-07-13 PROCEDURE — C1776: CPT

## 2023-07-13 PROCEDURE — 85027 COMPLETE CBC AUTOMATED: CPT

## 2023-07-13 PROCEDURE — 82962 GLUCOSE BLOOD TEST: CPT

## 2023-07-13 PROCEDURE — 94664 DEMO&/EVAL PT USE INHALER: CPT

## 2023-07-13 PROCEDURE — 97535 SELF CARE MNGMENT TRAINING: CPT

## 2023-07-13 PROCEDURE — 73501 X-RAY EXAM HIP UNI 1 VIEW: CPT

## 2023-07-13 PROCEDURE — 97165 OT EVAL LOW COMPLEX 30 MIN: CPT

## 2023-07-13 PROCEDURE — 97530 THERAPEUTIC ACTIVITIES: CPT

## 2023-07-13 PROCEDURE — C1713: CPT

## 2023-07-13 PROCEDURE — 99232 SBSQ HOSP IP/OBS MODERATE 35: CPT

## 2023-07-13 PROCEDURE — 36415 COLL VENOUS BLD VENIPUNCTURE: CPT

## 2023-07-13 PROCEDURE — 97161 PT EVAL LOW COMPLEX 20 MIN: CPT

## 2023-07-13 PROCEDURE — 97110 THERAPEUTIC EXERCISES: CPT

## 2023-07-13 RX ORDER — OXYCODONE HYDROCHLORIDE 5 MG/1
1 TABLET ORAL
Qty: 42 | Refills: 0
Start: 2023-07-13 | End: 2023-07-19

## 2023-07-13 RX ADMIN — Medication 1: at 12:50

## 2023-07-13 RX ADMIN — OXYCODONE HYDROCHLORIDE 5 MILLIGRAM(S): 5 TABLET ORAL at 03:06

## 2023-07-13 RX ADMIN — OXYCODONE HYDROCHLORIDE 5 MILLIGRAM(S): 5 TABLET ORAL at 08:40

## 2023-07-13 RX ADMIN — Medication 81 MILLIGRAM(S): at 06:04

## 2023-07-13 RX ADMIN — OXYCODONE HYDROCHLORIDE 5 MILLIGRAM(S): 5 TABLET ORAL at 12:35

## 2023-07-13 RX ADMIN — Medication 101.6 MILLIGRAM(S): at 05:59

## 2023-07-13 RX ADMIN — Medication 1000 MILLIGRAM(S): at 05:59

## 2023-07-13 RX ADMIN — METFORMIN HYDROCHLORIDE 500 MILLIGRAM(S): 850 TABLET ORAL at 08:38

## 2023-07-13 RX ADMIN — PANTOPRAZOLE SODIUM 40 MILLIGRAM(S): 20 TABLET, DELAYED RELEASE ORAL at 05:59

## 2023-07-13 RX ADMIN — OXYCODONE HYDROCHLORIDE 5 MILLIGRAM(S): 5 TABLET ORAL at 03:39

## 2023-07-13 RX ADMIN — OXYCODONE HYDROCHLORIDE 5 MILLIGRAM(S): 5 TABLET ORAL at 09:15

## 2023-07-13 RX ADMIN — Medication 1000 MILLIGRAM(S): at 06:04

## 2023-07-13 RX ADMIN — CELECOXIB 200 MILLIGRAM(S): 200 CAPSULE ORAL at 08:38

## 2023-07-13 RX ADMIN — Medication 1: at 07:51

## 2023-07-13 RX ADMIN — CELECOXIB 200 MILLIGRAM(S): 200 CAPSULE ORAL at 08:42

## 2023-07-13 RX ADMIN — Medication 1000 MILLIGRAM(S): at 13:32

## 2023-07-13 RX ADMIN — OXYCODONE HYDROCHLORIDE 5 MILLIGRAM(S): 5 TABLET ORAL at 13:10

## 2023-07-13 NOTE — PROGRESS NOTE ADULT - ASSESSMENT
63M s/p L hip replacement    #L hip replacement POD1  - abx and DVT ppx per ortho  - pain control  - PT/OT  - bowel regimen  - incentive spirometer    #Leukocytosis  - WBC of 12 likely reactive vs steroid induced  - no signs of acute infection  - monitor outpt    #Hypoxia  - sats of 90% on room air improved to 92%  - likely post op, atelectasis  - encourage incentive spirometer and ambulation  - keep O2 sats >92%    #HTN  - resume amlodipine 10mg POD2  - hold HCTZ 12.5mg, can resume upon discharge  - monitor blood pressures closely    #HLD  - resume statin    #T2DM  - A1c 7.6  - metformin  - monitor blood glucose    No objections to discharge if pt's O2 sats remain >92%

## 2023-07-13 NOTE — PHARMACOTHERAPY INTERVENTION NOTE - COMMENTS
Admission medication reconciliation POD0  
Transition of Care video discharge education - medication calendar given to patient

## 2023-07-13 NOTE — PROGRESS NOTE ADULT - SUBJECTIVE AND OBJECTIVE BOX
Discharge medication calendar:  ASA EC 81mg q12h x 4 weeks  Omeprazole 20mg QAM x 4 weeks  Celecoxib 200mg q12h x 2-3 weeks  APAP 1000mg q8h x 2-3 weeks  Narcotic PRN  Docusate 100mg TID while taking narcotic  Miralax, Senna, or Bisacodyl PRN for treatment of constipation  
Orthopaedic Post Op Note    Procedure: Left THR  Surgeon: Maikol Velasco     63y Male comfortable, without complaints. Tolerating diet. Reported pain score = 0  Denies N/V, CP, SOB, numbness/tingling of extremities.    PE:  Vital Signs Last 24 Hrs  T(C): 36.5 (12 Jul 2023 12:00), Max: 36.6 (12 Jul 2023 05:53)  T(F): 97.7 (12 Jul 2023 12:00), Max: 97.9 (12 Jul 2023 05:53)  HR: 68 (12 Jul 2023 12:00) (58 - 77)  BP: 143/79 (12 Jul 2023 12:00) (113/89 - 143/79)  RR: 18 (12 Jul 2023 12:00) (13 - 19)  SpO2: 93% (12 Jul 2023 12:00) (92% - 99%)    Parameters below as of 12 Jul 2023 12:00  Patient On (Oxygen Delivery Method): room air      General: Pt alert and oriented   Lungs: + BS CTA bilaterally  Heart: +S1 & S2 heard, RRR  Abd: + BS heard, soft, NT, ND  Left Hip Mepilex Dressing: C/D/I   Abd pillow in place  Bilateral LEs:  Motor:   5/5 dorsiflexion, plantarflexion, EHL  Sensation intact to LT  2+ DP Pulses  SCDs in place    A/P: 63y Male stable POD#0 s/p Left THR   -  Acetaminophen, Celebrex, Oxycodone, Dilaudid for Pain Control   - DVT ppx: Aspirin 81mg q 12 h   - Za op IV abx: Ancef   - Celebrex for HO ppx  - total hip precautions  - PT, OT per protocol  - F/U AM Labs  DCP = home tomorrow pending PT, OT, medical clearance       
POST OPERATIVE DAY #:  [1 ]   STATUS POST: [x ] Left  [x ]THR                        Patient is a 63y old  Male who presents with a chief complaint of L hip replacement (12 Jul 2023 13:57)      Pain well controlled    OBJECTIVE:     Vital Signs Last 24 Hrs  T(C): 36.7 (13 Jul 2023 08:01), Max: 36.9 (12 Jul 2023 23:30)  T(F): 98.1 (13 Jul 2023 08:01), Max: 98.5 (13 Jul 2023 03:30)  HR: 63 (13 Jul 2023 08:01) (60 - 77)  BP: 149/81 (13 Jul 2023 08:01) (113/89 - 149/81)  BP(mean): --  RR: 20 (13 Jul 2023 08:01) (13 - 20)  SpO2: 92% (13 Jul 2023 08:01) (90% - 99%)    Parameters below as of 13 Jul 2023 08:01  Patient On (Oxygen Delivery Method): room air        Affected extremity:          Dressing:  clean/dry/intact           Sensation; intact to light touch  [ ] decreased:          Motor exam: Toes warm and mobile          No calf tenderness bilateral LE's    LABS:                        11.5   12.44 )-----------( 168      ( 13 Jul 2023 06:00 )             36.4                   A/P :    -    Analgesics PRN  -    DVT ppx: [xASA 81 bid [ ] Lovenox [ ] Coumadin   [ ] Eliquis   -    Check AM labs  -    Weight bearing status: WBAT [x]        PWB    [ ]     TTWB  [ ]      NWB  [ ]  -    Physical Therapy  -    Occupational Therapy  -    Dispo: Home [ x]     Rehab [ ]      CHANNING [ ]      To be determined [ ]  
PROGRESS NOTE:   Authored by Dr. Tyler Molina MD, Available on MS Teams    Patient is a 63y old  Male who presents with a chief complaint of L hip replacement (12 Jul 2023 13:57)      SUBJECTIVE / OVERNIGHT EVENTS: No chest pain or shortness of breath. Hip pain is well controlled    ADDITIONAL REVIEW OF SYSTEMS:    MEDICATIONS  (STANDING):  acetaminophen     Tablet .. 1000 milliGRAM(s) Oral every 8 hours  aspirin enteric coated 81 milliGRAM(s) Oral every 12 hours  atorvastatin 10 milliGRAM(s) Oral at bedtime  celecoxib 200 milliGRAM(s) Oral every 12 hours  dextrose 5%. 1000 milliLiter(s) (100 mL/Hr) IV Continuous <Continuous>  dextrose 5%. 1000 milliLiter(s) (50 mL/Hr) IV Continuous <Continuous>  dextrose 50% Injectable 25 Gram(s) IV Push once  dextrose 50% Injectable 12.5 Gram(s) IV Push once  dextrose 50% Injectable 25 Gram(s) IV Push once  glucagon  Injectable 1 milliGRAM(s) IntraMuscular once  insulin lispro (ADMELOG) corrective regimen sliding scale   SubCutaneous three times a day before meals  lactated ringers. 1000 milliLiter(s) (100 mL/Hr) IV Continuous <Continuous>  metFORMIN 500 milliGRAM(s) Oral daily  pantoprazole    Tablet 40 milliGRAM(s) Oral before breakfast  polyethylene glycol 3350 17 Gram(s) Oral at bedtime  senna 2 Tablet(s) Oral at bedtime    MEDICATIONS  (PRN):  dextrose Oral Gel 15 Gram(s) Oral once PRN Blood Glucose LESS THAN 70 milliGRAM(s)/deciliter  HYDROmorphone  Injectable 0.5 milliGRAM(s) IV Push every 3 hours PRN breakthrough pain  magnesium hydroxide Suspension 30 milliLiter(s) Oral daily PRN Constipation  ondansetron Injectable 4 milliGRAM(s) IV Push every 6 hours PRN Nausea and/or Vomiting  oxyCODONE    IR 5 milliGRAM(s) Oral every 3 hours PRN Moderate Pain (4 - 6)  oxyCODONE    IR 10 milliGRAM(s) Oral every 3 hours PRN Severe Pain (7 - 10)      CAPILLARY BLOOD GLUCOSE      POCT Blood Glucose.: 156 mg/dL (13 Jul 2023 07:28)  POCT Blood Glucose.: 190 mg/dL (12 Jul 2023 21:37)  POCT Blood Glucose.: 215 mg/dL (12 Jul 2023 16:59)  POCT Blood Glucose.: 157 mg/dL (12 Jul 2023 12:22)    I&O's Summary    12 Jul 2023 07:01  -  13 Jul 2023 07:00  --------------------------------------------------------  IN: 1800 mL / OUT: 2920 mL / NET: -1120 mL        PHYSICAL EXAM:  Vital Signs Last 24 Hrs  T(C): 36.7 (13 Jul 2023 08:01), Max: 36.9 (12 Jul 2023 23:30)  T(F): 98.1 (13 Jul 2023 08:01), Max: 98.5 (13 Jul 2023 03:30)  HR: 63 (13 Jul 2023 08:01) (60 - 77)  BP: 149/81 (13 Jul 2023 08:01) (113/89 - 149/81)  BP(mean): --  RR: 20 (13 Jul 2023 08:01) (13 - 20)  SpO2: 92% (13 Jul 2023 08:01) (90% - 99%)    Parameters below as of 13 Jul 2023 08:01  Patient On (Oxygen Delivery Method): room air        CONSTITUTIONAL: NAD, well-developed  RESPIRATORY: Normal respiratory effort; lungs are clear to auscultation bilaterally  CARDIOVASCULAR: Regular rate and rhythm, normal S1 and S2, no murmur/rub/gallop; No lower extremity edema  ABDOMEN: Nontender to palpation, normoactive bowel sounds, no rebound/guarding  MUSCLOSKELETAL: no clubbing or cyanosis of digits; no joint swelling, L hip dressing  PSYCH: A+O to person, place, and time; affect appropriate    LABS:                        11.5   12.44 )-----------( 168      ( 13 Jul 2023 06:00 )             36.4     07-13    140  |  105  |  13  ----------------------------<  161<H>  4.2   |  28  |  0.91    Ca    8.8      13 Jul 2023 06:00    Urinalysis Basic - ( 13 Jul 2023 06:00 )    Color: x / Appearance: x / SG: x / pH: x  Gluc: 161 mg/dL / Ketone: x  / Bili: x / Urobili: x   Blood: x / Protein: x / Nitrite: x   Leuk Esterase: x / RBC: x / WBC x   Sq Epi: x / Non Sq Epi: x / Bacteria: x

## 2023-07-13 NOTE — CASE MANAGEMENT PROGRESS NOTE - NSCMPROGRESSNOTE_GEN_ALL_CORE
CASE MANAGEMENT made patient aware that home care each visit is $50 per visit. Patient stated he also has medicare but does not have it with him. CM provided contact information for patient to call with medicare info. CM will follow up.

## 2023-07-24 ENCOUNTER — APPOINTMENT (OUTPATIENT)
Dept: ORTHOPEDIC SURGERY | Facility: CLINIC | Age: 63
End: 2023-07-24
Payer: COMMERCIAL

## 2023-07-24 PROCEDURE — 73502 X-RAY EXAM HIP UNI 2-3 VIEWS: CPT

## 2023-07-24 PROCEDURE — 99024 POSTOP FOLLOW-UP VISIT: CPT

## 2023-07-24 NOTE — HISTORY OF PRESENT ILLNESS
[___ Weeks Post Op] : [unfilled] weeks post op [Chills] : no chills [Constipation] : no constipation [Diarrhea] : no diarrhea [Dysuria] : no dysuria [Fever] : no fever [Nausea] : no nausea [Vomiting] : no vomiting [Clean/Dry/Intact] : clean, dry and intact [Healed] : healed [Erythema] : not erythematous [Discharge] : absent of discharge [Dehiscence] : not dehisced [Neuro Intact] : an unremarkable neurological exam [Vascular Intact] : ~T peripheral vascular exam normal [Negative Darrick's] : maneuvers demonstrated a negative Darrick's sign [Doing Well] : is doing well [Excellent Pain Control] : has excellent pain control [No Sign of Infection] : is showing no signs of infection [de-identified] : Left total hip replacement 7/12/2023 [de-identified] : Patient is a 63-year-old male who presents today for an evaluation regarding his left hip.  He is status post left total hip replacement on July 12, 2023.  Overall the patient is doing very well with little complaints of any pain and discomfort.  He states that he is receiving physical therapy at home and will start outpatient physical therapy past Wednesday.  He states that he has been taking his Celebrex as prescribed along with his Ecotrin.  For DVT prophylaxis.  He states that he is using pain medication sporadically and is mostly relying on Tylenol.  Oxycodone is mostly at night by the end of the day.  Currently the pain is a 3 on a scale of 1-10. [de-identified] : On physical examination of the left hip. Patient is status post left total hip replacement. There is a well-healed surgical scar. There is no redness, swelling, heat, discharge or any other evidence of infection superficial or deep. Prineo tape is properly secured to the pt, and and removed without incident. There is some mild diffuse postoperative pain and tenderness most noticeable near the incision. The patient has good passive range of motion.  However extreme range of motion was not assessed due to posterior hip precautions. There is no evidence of any limb length discrepancy. No evidence of any muscle atrophy. No evidence of any palpable defect. No evidence of any motor or sensory deficit. Patient has good distal pulses with no calf tenderness. Darrick's test was negative.  [de-identified] : X-rays of the left hip reveals a status post left total hip replacement. In both views, the pelvis and left hip. The hardware is in place and shows excellent alignment as well as fixation. There is no evidence of limb length discrepancies. There is no evidence of any fracture, dislocation, loosening of the prosthesis.\par  [de-identified] : Status post left total hip replacement on July 12, 2023 [de-identified] : Plan for the patient is to continue with the current management.  This includes ice packs, moist heat, leg elevation and pain medications as needed. It is suggested that the pt continue with the physical therapy as well as exercises at home. A prescription was given to the patient.  This to include walking, isometric, range of motion and strengthening exercises.  The pt is also advised to continue with the anti-inflammatories to help with pain and swelling,but mostly to prevent heterotropic bone formation. The pt was also encouraged to continue with the prescribed blood thinners to help prevent a blood clot. These were originally prescribed when discharged from the hospital. The patient is also advised to return back to the office in another 4 weeks for reevaluation and x-rays.  However, if there is any increase in pain, redness, swelling, heat, discharge or fever. The patient is advised to notify the office, sooner.\par \par 35 minutes were spent, face to face, in direct consultation with the patient. This includes reviewing the natural history of their Dx., eliciting the history, performing an orthopedic exam, review of the x-ray findings, forming a differential Dx and discussing all treatment options. This Includes both surgical and non-surgical treatments. I also reviewed all the risks and benefits of non-operative & operative Tx options, future impact into orthopedic functions/problems, activity restrictions both at home and at work, and all follow up requirements.\par \par \par

## 2023-09-12 ENCOUNTER — APPOINTMENT (OUTPATIENT)
Dept: ORTHOPEDIC SURGERY | Facility: CLINIC | Age: 63
End: 2023-09-12
Payer: COMMERCIAL

## 2023-09-12 VITALS
DIASTOLIC BLOOD PRESSURE: 84 MMHG | SYSTOLIC BLOOD PRESSURE: 155 MMHG | BODY MASS INDEX: 33.34 KG/M2 | WEIGHT: 220 LBS | HEIGHT: 68 IN | HEART RATE: 60 BPM

## 2023-09-12 DIAGNOSIS — Z96.642 PRESENCE OF LEFT ARTIFICIAL HIP JOINT: ICD-10-CM

## 2023-09-12 PROCEDURE — 99024 POSTOP FOLLOW-UP VISIT: CPT

## 2023-09-12 PROCEDURE — 73502 X-RAY EXAM HIP UNI 2-3 VIEWS: CPT | Mod: LT

## 2023-12-27 PROBLEM — M51.26 OTHER INTERVERTEBRAL DISC DISPLACEMENT, LUMBAR REGION: Chronic | Status: ACTIVE | Noted: 2023-06-23

## 2024-02-23 ENCOUNTER — APPOINTMENT (OUTPATIENT)
Dept: VASCULAR SURGERY | Facility: CLINIC | Age: 64
End: 2024-02-23
Payer: COMMERCIAL

## 2024-02-23 VITALS
DIASTOLIC BLOOD PRESSURE: 82 MMHG | HEART RATE: 64 BPM | TEMPERATURE: 98 F | WEIGHT: 222 LBS | SYSTOLIC BLOOD PRESSURE: 144 MMHG | BODY MASS INDEX: 33.65 KG/M2 | HEIGHT: 68 IN

## 2024-02-23 VITALS — SYSTOLIC BLOOD PRESSURE: 135 MMHG | DIASTOLIC BLOOD PRESSURE: 75 MMHG | HEART RATE: 62 BPM

## 2024-02-23 PROCEDURE — 99203 OFFICE O/P NEW LOW 30 MIN: CPT

## 2024-02-23 PROCEDURE — 93970 EXTREMITY STUDY: CPT

## 2024-02-23 RX ORDER — ATORVASTATIN CALCIUM 80 MG/1
TABLET, FILM COATED ORAL
Refills: 0 | Status: ACTIVE | COMMUNITY

## 2024-02-23 RX ORDER — LOSARTAN POTASSIUM 100 MG/1
TABLET, FILM COATED ORAL
Refills: 0 | Status: ACTIVE | COMMUNITY

## 2024-02-23 RX ORDER — METFORMIN HYDROCHLORIDE 625 MG/1
TABLET ORAL
Refills: 0 | Status: ACTIVE | COMMUNITY

## 2024-02-23 RX ORDER — HYDROCHLOROTHIAZIDE 12.5 MG/1
CAPSULE ORAL
Refills: 0 | Status: ACTIVE | COMMUNITY

## 2024-02-23 RX ORDER — AMLODIPINE BESYLATE 5 MG/1
TABLET ORAL
Refills: 0 | Status: ACTIVE | COMMUNITY

## 2024-10-08 NOTE — PATIENT PROFILE ADULT - FUNCTIONAL ASSESSMENT - DAILY ACTIVITY 5.
Vidhya from [facility] would like to discuss an antidepressant medication for the pt. The hospice physician suggested crystal.     [phone]   4 = No assist / stand by assistance

## (undated) DEVICE — SOL IRR POUR H2O 1500ML

## (undated) DEVICE — HOOD FLYTE STRYKER HELMET SHIELD

## (undated) DEVICE — WARMING BLANKET UPPER ADULT

## (undated) DEVICE — DRSG SILVERLON ISLAND 4X10" 2X8" PAD

## (undated) DEVICE — GLV 7.5 PROTEXIS

## (undated) DEVICE — CANISTER SUCTION LID GUARD 3000CC

## (undated) DEVICE — SUT DERMABOND PRINEO 60CM

## (undated) DEVICE — ELCTR ROCKER SWITCH PENCIL BLUE 10FT

## (undated) DEVICE — POSITIONER ABDUCTION PILLOW MED CARDINAL

## (undated) DEVICE — WOUND IRR IRRISEPT W 0.5 CHG

## (undated) DEVICE — POSITIONER FOAM ABDUCTION PILLOW MED (PINK)

## (undated) DEVICE — SUT MONOCRYL 3-0 18" PS-1

## (undated) DEVICE — POSITIONER POSITIONING ROLL  5X17"

## (undated) DEVICE — SOLIDIFIER CANN EXPRESS 3K

## (undated) DEVICE — PACK TOTAL HIP CUST

## (undated) DEVICE — DRSG SILVERLON ISLAND 4X6" 2X4" PAD

## (undated) DEVICE — CONTAINER SPECIMEN PET

## (undated) DEVICE — SUT VICRYL PLUS 2-0 27" CP-1 UNDYED

## (undated) DEVICE — ELCTR AQUAMANTYS BIPOLAR SEALER 6.0

## (undated) DEVICE — SEALER BIPOLAR 6.0 AQUAMANTYS

## (undated) DEVICE — ELCTR PENCIL NEPTUNE SMOKE EVACUATION

## (undated) DEVICE — SYR LUER LOK 20CC

## (undated) DEVICE — SOL IRR BAG NS 0.9% 3000ML

## (undated) DEVICE — POSITIONER PATIENT SAFETY STRAP 3X60"

## (undated) DEVICE — SOL IRR POUR NS 0.9% 500ML

## (undated) DEVICE — SYR LUER LOK 50CC

## (undated) DEVICE — BAG SPONGE COUNTER EZ

## (undated) DEVICE — VENODYNE/SCD SLEEVE CALF MEDIUM

## (undated) DEVICE — GLV 7.5 PROTEXIS (WHITE)

## (undated) DEVICE — HANDPIECE INTERPULSE W MULTI TIP

## (undated) DEVICE — GOWN XL W TOWEL

## (undated) DEVICE — DRSG COMBINE 5X9"

## (undated) DEVICE — GLV 8 PROTEXIS (WHITE)

## (undated) DEVICE — GLV 8 ESTEEM BLUE

## (undated) DEVICE — BLANKET WARMER UPPER ADULT

## (undated) DEVICE — DRILL BIT MICROAIRE TWIST 2.4MM X 127MM

## (undated) DEVICE — IRRISEPT JET LAVAGE W 0.05 PCT CHG

## (undated) DEVICE — SUT VICRYL PLUS 1 27" CP UNDYED

## (undated) DEVICE — DRSG DERMABOND PRINEO 60CM

## (undated) DEVICE — SUT ETHIBOND 1 30" OS6

## (undated) DEVICE — POSITIONER STRAP ARMBOARD VELCRO TS-30 12

## (undated) DEVICE — DRSG MEPILEX 10 X 30CM (4 X 12") POST OP AG SILVER

## (undated) DEVICE — NDL SPINAL 18G X 3.5"

## (undated) DEVICE — POSITIONER FOAM HEAD CRADLE (PINK)

## (undated) DEVICE — GLV 8 PROTEXIS (BLUE)

## (undated) DEVICE — NDL SPINAL 18G X 3.5" (PINK)

## (undated) DEVICE — POSITIONER STRAP ARMBOARD VELCRO TS-30

## (undated) DEVICE — GLV 8 PROTEXIS

## (undated) DEVICE — PACK TOTAL HIP

## (undated) DEVICE — POSITIONER FOAM HEAD CRADLE

## (undated) DEVICE — POSITIONER STIRRUP STRAP W SLIP RING 19X3.5"

## (undated) DEVICE — SYM-STRYKER SYSTEM 7: Type: DURABLE MEDICAL EQUIPMENT

## (undated) DEVICE — SPECIMEN CONTAINER PET

## (undated) DEVICE — WRAP COMPRESSION CALF MED